# Patient Record
Sex: MALE | Race: WHITE | NOT HISPANIC OR LATINO | URBAN - METROPOLITAN AREA
[De-identification: names, ages, dates, MRNs, and addresses within clinical notes are randomized per-mention and may not be internally consistent; named-entity substitution may affect disease eponyms.]

---

## 2023-11-06 ENCOUNTER — HOSPITAL ENCOUNTER (EMERGENCY)
Facility: HOSPITAL | Age: 43
Discharge: HOME | End: 2023-11-06
Payer: COMMERCIAL

## 2023-11-06 ENCOUNTER — APPOINTMENT (OUTPATIENT)
Dept: RADIOLOGY | Facility: HOSPITAL | Age: 43
End: 2023-11-06
Payer: COMMERCIAL

## 2023-11-06 VITALS
OXYGEN SATURATION: 97 % | DIASTOLIC BLOOD PRESSURE: 80 MMHG | HEIGHT: 72 IN | WEIGHT: 201 LBS | RESPIRATION RATE: 16 BRPM | BODY MASS INDEX: 27.22 KG/M2 | TEMPERATURE: 98.2 F | SYSTOLIC BLOOD PRESSURE: 155 MMHG | HEART RATE: 77 BPM

## 2023-11-06 DIAGNOSIS — R10.9 FLANK PAIN: Primary | ICD-10-CM

## 2023-11-06 DIAGNOSIS — N20.0 RENAL CALCULI: ICD-10-CM

## 2023-11-06 PROBLEM — F11.10 NARCOTIC ABUSE (MULTI): Status: ACTIVE | Noted: 2023-11-06

## 2023-11-06 PROBLEM — I73.00 RAYNAUD'S DISEASE WITHOUT GANGRENE: Status: ACTIVE | Noted: 2021-03-24

## 2023-11-06 PROBLEM — F17.200 TOBACCO USE DISORDER: Status: ACTIVE | Noted: 2021-03-24

## 2023-11-06 PROBLEM — M79.643 HAND PAIN: Status: ACTIVE | Noted: 2023-11-06

## 2023-11-06 PROBLEM — R52 TINGLING PAIN: Status: ACTIVE | Noted: 2023-11-06

## 2023-11-06 PROBLEM — G43.109 MIGRAINE WITH AURA: Status: ACTIVE | Noted: 2023-11-06

## 2023-11-06 PROBLEM — Z71.85 VACCINE COUNSELING: Status: ACTIVE | Noted: 2023-11-06

## 2023-11-06 PROBLEM — E78.5 HYPERLIPIDEMIA: Status: ACTIVE | Noted: 2023-11-06

## 2023-11-06 PROBLEM — I10 ESSENTIAL HYPERTENSION: Status: ACTIVE | Noted: 2021-03-24

## 2023-11-06 LAB
ALBUMIN SERPL BCP-MCNC: 4.5 G/DL (ref 3.4–5)
ALP SERPL-CCNC: 57 U/L (ref 33–120)
ALT SERPL W P-5'-P-CCNC: 27 U/L (ref 10–52)
ANION GAP SERPL CALC-SCNC: 11 MMOL/L (ref 10–20)
APPEARANCE UR: CLEAR
AST SERPL W P-5'-P-CCNC: 17 U/L (ref 9–39)
BASOPHILS # BLD AUTO: 0.05 X10*3/UL (ref 0–0.1)
BASOPHILS NFR BLD AUTO: 0.5 %
BILIRUB SERPL-MCNC: 0.5 MG/DL (ref 0–1.2)
BILIRUB UR STRIP.AUTO-MCNC: NEGATIVE MG/DL
BUN SERPL-MCNC: 16 MG/DL (ref 6–23)
CALCIUM SERPL-MCNC: 9.1 MG/DL (ref 8.6–10.3)
CHLORIDE SERPL-SCNC: 104 MMOL/L (ref 98–107)
CO2 SERPL-SCNC: 26 MMOL/L (ref 21–32)
COLOR UR: NORMAL
CREAT SERPL-MCNC: 0.89 MG/DL (ref 0.5–1.3)
EOSINOPHIL # BLD AUTO: 0.17 X10*3/UL (ref 0–0.7)
EOSINOPHIL NFR BLD AUTO: 1.6 %
ERYTHROCYTE [DISTWIDTH] IN BLOOD BY AUTOMATED COUNT: 12.2 % (ref 11.5–14.5)
GFR SERPL CREATININE-BSD FRML MDRD: >90 ML/MIN/1.73M*2
GLUCOSE SERPL-MCNC: 95 MG/DL (ref 74–99)
GLUCOSE UR STRIP.AUTO-MCNC: NEGATIVE MG/DL
HCT VFR BLD AUTO: 45.1 % (ref 41–52)
HGB BLD-MCNC: 15.4 G/DL (ref 13.5–17.5)
IMM GRANULOCYTES # BLD AUTO: 0.05 X10*3/UL (ref 0–0.7)
IMM GRANULOCYTES NFR BLD AUTO: 0.5 % (ref 0–0.9)
KETONES UR STRIP.AUTO-MCNC: NEGATIVE MG/DL
LEUKOCYTE ESTERASE UR QL STRIP.AUTO: NEGATIVE
LYMPHOCYTES # BLD AUTO: 2.33 X10*3/UL (ref 1.2–4.8)
LYMPHOCYTES NFR BLD AUTO: 22.4 %
MCH RBC QN AUTO: 34.5 PG (ref 26–34)
MCHC RBC AUTO-ENTMCNC: 34.1 G/DL (ref 32–36)
MCV RBC AUTO: 101 FL (ref 80–100)
MONOCYTES # BLD AUTO: 0.77 X10*3/UL (ref 0.1–1)
MONOCYTES NFR BLD AUTO: 7.4 %
NEUTROPHILS # BLD AUTO: 7.01 X10*3/UL (ref 1.2–7.7)
NEUTROPHILS NFR BLD AUTO: 67.6 %
NITRITE UR QL STRIP.AUTO: NEGATIVE
NRBC BLD-RTO: 0 /100 WBCS (ref 0–0)
PH UR STRIP.AUTO: 6 [PH]
PLATELET # BLD AUTO: 235 X10*3/UL (ref 150–450)
POTASSIUM SERPL-SCNC: 4.4 MMOL/L (ref 3.5–5.3)
PROT SERPL-MCNC: 7.3 G/DL (ref 6.4–8.2)
PROT UR STRIP.AUTO-MCNC: NEGATIVE MG/DL
RBC # BLD AUTO: 4.46 X10*6/UL (ref 4.5–5.9)
RBC # UR STRIP.AUTO: NEGATIVE /UL
SODIUM SERPL-SCNC: 137 MMOL/L (ref 136–145)
SP GR UR STRIP.AUTO: 1.01
UROBILINOGEN UR STRIP.AUTO-MCNC: <2 MG/DL
WBC # BLD AUTO: 10.4 X10*3/UL (ref 4.4–11.3)

## 2023-11-06 PROCEDURE — 96361 HYDRATE IV INFUSION ADD-ON: CPT

## 2023-11-06 PROCEDURE — 74176 CT ABD & PELVIS W/O CONTRAST: CPT

## 2023-11-06 PROCEDURE — 81003 URINALYSIS AUTO W/O SCOPE: CPT | Performed by: NURSE PRACTITIONER

## 2023-11-06 PROCEDURE — 36415 COLL VENOUS BLD VENIPUNCTURE: CPT | Performed by: NURSE PRACTITIONER

## 2023-11-06 PROCEDURE — 96374 THER/PROPH/DIAG INJ IV PUSH: CPT

## 2023-11-06 PROCEDURE — 2500000004 HC RX 250 GENERAL PHARMACY W/ HCPCS (ALT 636 FOR OP/ED): Performed by: NURSE PRACTITIONER

## 2023-11-06 PROCEDURE — 80053 COMPREHEN METABOLIC PANEL: CPT | Performed by: NURSE PRACTITIONER

## 2023-11-06 PROCEDURE — 99285 EMERGENCY DEPT VISIT HI MDM: CPT | Mod: 25

## 2023-11-06 PROCEDURE — 99284 EMERGENCY DEPT VISIT MOD MDM: CPT | Mod: 25

## 2023-11-06 PROCEDURE — 96375 TX/PRO/DX INJ NEW DRUG ADDON: CPT

## 2023-11-06 PROCEDURE — 74176 CT ABD & PELVIS W/O CONTRAST: CPT | Performed by: RADIOLOGY

## 2023-11-06 PROCEDURE — 85025 COMPLETE CBC W/AUTO DIFF WBC: CPT | Performed by: NURSE PRACTITIONER

## 2023-11-06 RX ORDER — KETOROLAC TROMETHAMINE 30 MG/ML
15 INJECTION, SOLUTION INTRAMUSCULAR; INTRAVENOUS ONCE
Status: COMPLETED | OUTPATIENT
Start: 2023-11-06 | End: 2023-11-06

## 2023-11-06 RX ORDER — ONDANSETRON HYDROCHLORIDE 2 MG/ML
4 INJECTION, SOLUTION INTRAVENOUS ONCE
Status: COMPLETED | OUTPATIENT
Start: 2023-11-06 | End: 2023-11-06

## 2023-11-06 RX ORDER — ONDANSETRON 4 MG/1
4 TABLET, ORALLY DISINTEGRATING ORAL EVERY 8 HOURS PRN
Qty: 20 TABLET | Refills: 0 | Status: SHIPPED | OUTPATIENT
Start: 2023-11-06 | End: 2023-11-13

## 2023-11-06 RX ADMIN — SODIUM CHLORIDE 1000 ML: 9 INJECTION, SOLUTION INTRAVENOUS at 11:27

## 2023-11-06 RX ADMIN — ONDANSETRON 4 MG: 2 INJECTION INTRAMUSCULAR; INTRAVENOUS at 11:27

## 2023-11-06 RX ADMIN — KETOROLAC TROMETHAMINE 15 MG: 30 INJECTION, SOLUTION INTRAMUSCULAR; INTRAVENOUS at 11:27

## 2023-11-06 ASSESSMENT — COLUMBIA-SUICIDE SEVERITY RATING SCALE - C-SSRS
1. IN THE PAST MONTH, HAVE YOU WISHED YOU WERE DEAD OR WISHED YOU COULD GO TO SLEEP AND NOT WAKE UP?: NO
2. HAVE YOU ACTUALLY HAD ANY THOUGHTS OF KILLING YOURSELF?: NO
6. HAVE YOU EVER DONE ANYTHING, STARTED TO DO ANYTHING, OR PREPARED TO DO ANYTHING TO END YOUR LIFE?: NO

## 2023-11-06 ASSESSMENT — PAIN SCALES - GENERAL: PAINLEVEL_OUTOF10: 8

## 2023-11-06 ASSESSMENT — PAIN DESCRIPTION - ORIENTATION: ORIENTATION: RIGHT

## 2023-11-06 ASSESSMENT — PAIN - FUNCTIONAL ASSESSMENT: PAIN_FUNCTIONAL_ASSESSMENT: 0-10

## 2023-11-06 ASSESSMENT — PAIN DESCRIPTION - LOCATION: LOCATION: BACK

## 2023-11-06 NOTE — DISCHARGE INSTRUCTIONS
"What are kidney stones?  Kidney stones are just what they sound like: small stones that form inside the kidneys. They form when salts and minerals that are normally in urine build up and harden.    Kidney stones usually get carried out of the body when you urinate. But sometimes, they can get stuck on the way out (figure 1). If that happens, the stones can cause:    ?Pain in your side or in the lower part of your belly    ?Blood in your urine (which can make urine pink or red)    ?Nausea or vomiting    ?Pain when you urinate    ?Needing to urinate in a hurry    How do I know if I have kidney stones?  If your doctor or nurse thinks that you have kidney stones, they can order an imaging test that can show the stones. (Imaging tests create pictures of the inside of the body.)    How are kidney stones treated?  Each person's treatment is a little different. The right treatment for you will depend on:    ?The size, type, and location of your stone    ?How much pain you have    ?How much you are vomiting    If your stone is small and causes only mild symptoms, you might be able to stay home and wait for it to pass in your urine. If you are going to try this, your doctor will tell you what to do. This usually includes:    ?Drinking lots of fluids    ?Taking pain medicines or medicines that make it easier to pass the stone    ?Urinating through a strainer so you can catch the stone when it comes out    If your stone is big or causes severe symptoms, you might need treatment in the hospital. Kidney stones that do not pass on their own can be treated with:    ?\"Shock wave lithotripsy\" - A machine uses sound waves to break up stones into smaller pieces. This procedure does not involve surgery, but it can be painful.    ?\"Percutaneous nephrolithotomy\" - This is a special kind of surgery in which a doctor makes very small holes in your skin. The doctor passes tiny tools through the holes and into your kidney. Then, they remove " "the stone.    ?\"Ureteroscopy\" - A doctor puts a thin tube into your body the same way urine comes out. They use tools at the end of the tube to break up or remove stones.    What problems should I watch for?  If you are trying to pass a kidney stone at home, call your doctor or nurse for advice if:    ?You do not urinate for more than 8 hours.    ?You have a fever of 100.4°F (38°C) or higher, or chills.    ?Your urine is cloudy, smells bad, or has more blood in it than before.    ?The pain from your kidney stone gets very bad, and taking pain medicine doesn't help.    ?You are vomiting and can't keep liquids down.    ?Your pain does not go away after 1 to 2 weeks    What can I do to prevent getting kidney stones again?  Drink plenty of water. You might also need to change what you eat, depending on what your kidney stones were made of. If so, your doctor or nurse can tell you which foods to avoid. Your doctor or nurse might also prescribe you new medicines to keep you from having another kidney stone.  "

## 2023-11-06 NOTE — ED TRIAGE NOTES
Pt endorses pain to R flank since Saturday. Pt with hx of kidney stones, states similar pain as today. Pt denies any urological symptoms, denies blood in urine. Pt endorses nausea associated with episodes of flank pain. Pt ambulatory in triage, non-toxic appearing.

## 2023-11-06 NOTE — ED PROVIDER NOTES
HPI   Chief Complaint   Patient presents with   • Flank Pain       Patient is a healthy nontoxic-appearing 43-year-old male with past medical history of acute gastritis, depression, hypertension, esophageal reflux, gouty arthritis of the toe, hyperlipidemia, migraine, Raynaud's disease without gangrene, presents to the emergency room today for complaint of right flank pain.  Patient states pain began 2 days ago and feels similar to previous episodes when he had a kidney stone.  Patient complains of nausea without any vomiting, diarrhea or constipation.  Patient denies any injuries trauma or falls.  Patient denies any urinary complaints or blood in the urine.  Patient denies any testicular pain.  Patient denies any abdominal pain, chest pain, shortness of breath difficulty breathing, fever, shaking, or chills.                          No data recorded                Patient History   No past medical history on file.  No past surgical history on file.  No family history on file.  Social History     Tobacco Use   • Smoking status: Not on file   • Smokeless tobacco: Not on file   Substance Use Topics   • Alcohol use: Not on file   • Drug use: Not on file       Physical Exam   ED Triage Vitals [11/06/23 0956]   Temp Heart Rate Resp BP   36.8 °C (98.2 °F) 106 18 (!) 175/92      SpO2 Temp src Heart Rate Source Patient Position   100 % -- -- --      BP Location FiO2 (%)     -- --       Physical Exam  Vitals and nursing note reviewed. Exam conducted with a chaperone present.   Constitutional:       General: He is not in acute distress.     Appearance: Normal appearance. He is not ill-appearing, toxic-appearing or diaphoretic.   HENT:      Head: Normocephalic.      Nose: Nose normal.      Mouth/Throat:      Mouth: Mucous membranes are moist.   Eyes:      Extraocular Movements: Extraocular movements intact.      Pupils: Pupils are equal, round, and reactive to light.   Cardiovascular:      Rate and Rhythm: Normal rate and  regular rhythm.      Pulses: Normal pulses.      Heart sounds: Normal heart sounds. No murmur heard.     No friction rub. No gallop.   Pulmonary:      Effort: Pulmonary effort is normal. No respiratory distress.      Breath sounds: Normal breath sounds. No stridor. No wheezing, rhonchi or rales.   Chest:      Chest wall: No tenderness.   Abdominal:      General: There is no distension.      Palpations: There is no mass.      Tenderness: There is no abdominal tenderness. There is right CVA tenderness. There is no left CVA tenderness, guarding or rebound.      Hernia: No hernia is present.   Musculoskeletal:         General: Normal range of motion.      Cervical back: Normal range of motion and neck supple.   Skin:     General: Skin is warm and dry.      Capillary Refill: Capillary refill takes less than 2 seconds.      Coloration: Skin is not jaundiced or pale.      Findings: No bruising, erythema, lesion or rash.   Neurological:      Mental Status: He is alert.         ED Course & MDM   Diagnoses as of 11/06/23 1219   Flank pain   Renal calculi       Medical Decision Making  Given patient's complaint presentation a thorough exam was performed.  Patient does have right-sided CVA tenderness upon palpation with no reproducible abdominal tenderness upon palpation, no other distress lung sounds auscultated, speaking complete sentences no respiratory distress, denies any loss of bowel or bladder control, independently ambulatory down the difficulty, remains hemodynamically stable during emergency evaluation, I have a low suspicion for epidural abscess, cauda equina syndrome, acute abdomen.  Lab work was ordered as well as CT of the abdomen and pelvis. Lab work reveals several irregularities however no critical lab values, urinalysis was unremarkable, CT of the abdomen and pelvis reveals punctuate right kidney stone.  No hydronephrosis, small umbilical hernia, reevaluation of patient reveals moderate improvement in  symptoms have improved slightly but have been colicky.  Given findings I believe renal calculi is the source of patient's discomfort.  Patient offered Toradol for discomfort however declined and states like to use ibuprofen instead.  Patient did receive prescription for Zofran and encouraged following up with urology as needed however symptoms become worse return the emergency room for further evaluation.  Patient was agreeable this plan discharged home in stable condition.        Procedure  Procedures     KARLA Stallings-OMAIRA  11/06/23 1054       KARLA Stallings-OMAIRA  11/06/23 1216

## 2024-01-17 PROBLEM — I25.10 CAD (CORONARY ARTERY DISEASE): Chronic | Status: ACTIVE | Noted: 2024-01-17

## 2024-01-17 PROBLEM — I25.10 CAD (CORONARY ARTERY DISEASE): Status: ACTIVE | Noted: 2024-01-17

## 2024-01-24 ENCOUNTER — OFFICE VISIT (OUTPATIENT)
Dept: CARDIOLOGY | Facility: CLINIC | Age: 44
End: 2024-01-24
Payer: COMMERCIAL

## 2024-01-24 VITALS
SYSTOLIC BLOOD PRESSURE: 142 MMHG | DIASTOLIC BLOOD PRESSURE: 84 MMHG | BODY MASS INDEX: 28.35 KG/M2 | WEIGHT: 209 LBS | HEART RATE: 83 BPM | OXYGEN SATURATION: 96 %

## 2024-01-24 DIAGNOSIS — E78.2 MIXED HYPERLIPIDEMIA: Primary | Chronic | ICD-10-CM

## 2024-01-24 DIAGNOSIS — I25.10 CORONARY ARTERY DISEASE INVOLVING NATIVE CORONARY ARTERY OF NATIVE HEART WITHOUT ANGINA PECTORIS: Chronic | ICD-10-CM

## 2024-01-24 DIAGNOSIS — I10 ESSENTIAL HYPERTENSION: Chronic | ICD-10-CM

## 2024-01-24 PROBLEM — E78.5 HYPERLIPIDEMIA: Chronic | Status: ACTIVE | Noted: 2023-11-06

## 2024-01-24 PROCEDURE — 3079F DIAST BP 80-89 MM HG: CPT | Performed by: INTERNAL MEDICINE

## 2024-01-24 PROCEDURE — 99214 OFFICE O/P EST MOD 30 MIN: CPT | Performed by: INTERNAL MEDICINE

## 2024-01-24 PROCEDURE — 3077F SYST BP >= 140 MM HG: CPT | Performed by: INTERNAL MEDICINE

## 2024-01-24 RX ORDER — HYDROGEN PEROXIDE 3 %
20 SOLUTION, NON-ORAL MISCELLANEOUS AS NEEDED
COMMUNITY

## 2024-01-24 RX ORDER — ATORVASTATIN CALCIUM 40 MG/1
40 TABLET, FILM COATED ORAL DAILY
COMMUNITY
Start: 2023-03-23 | End: 2024-01-24

## 2024-01-24 RX ORDER — LOSARTAN POTASSIUM 100 MG/1
TABLET ORAL
COMMUNITY
Start: 2023-05-01

## 2024-01-24 RX ORDER — ERGOCALCIFEROL 1.25 MG/1
50000 CAPSULE ORAL
COMMUNITY
Start: 2023-05-01

## 2024-01-24 RX ORDER — AMLODIPINE BESYLATE 5 MG/1
5 TABLET ORAL DAILY
COMMUNITY
Start: 2023-03-23

## 2024-01-24 RX ORDER — METOPROLOL SUCCINATE 25 MG/1
25 TABLET, EXTENDED RELEASE ORAL DAILY
COMMUNITY
Start: 2024-01-17

## 2024-01-24 RX ORDER — ROSUVASTATIN CALCIUM 40 MG/1
40 TABLET, COATED ORAL DAILY
Qty: 90 TABLET | Refills: 3 | Status: SHIPPED | OUTPATIENT
Start: 2024-01-24 | End: 2024-02-06 | Stop reason: DRUGHIGH

## 2024-01-24 NOTE — PROGRESS NOTES
"Referred by No ref. provider found    HPI overall feeling fine.  Still getting atypical random episodes of chest pain predominantly at rest and usually not with activity.  This can last for a prolonged period of time up to 8 days.  Clinically not anginal.  Tolerating rosuvastatin without difficulty.    Past Medical History:  Problem List Items Addressed This Visit    None       Past Medical History:   Diagnosis Date    CAD (coronary artery disease) 01/17/2024 6/29/2023 = 336 (LM 70, , LCx 13, RCA 9)             Past Surgical History:  He has no past surgical history on file.      Social History:  He has no history on file for tobacco use, alcohol use, and drug use.    Family History:  No family history on file.     Allergies:  Atorvastatin and Lisinopril    Outpatient Medications:  No current outpatient medications     Last Recorded Vitals:  There were no vitals filed for this visit.    Physical Exam    Physical  Patient is alert and oriented x3.  HEENT is unremarkable mucous members are moist  Neck no JVP no bruits upstrokes are full no thyromegaly  Lungs are clear bilaterally.  No wheezing crackles or rales  Heart regular rhythm normal S1-S2 there is no S3 no murmurs are heard.  Abdomen is soft vessels are positive nontender nondistended no organomegaly no pulsatile masses  Extremities have no edema.  Distal pulses present palpable.  Neuro is grossly nonfocal  Skin has no rashes     Last Labs:  CBC -  Lab Results   Component Value Date    WBC 10.4 11/06/2023    HGB 15.4 11/06/2023    HCT 45.1 11/06/2023     (H) 11/06/2023     11/06/2023       CMP -  Lab Results   Component Value Date    CALCIUM 9.1 11/06/2023    PROT 7.3 11/06/2023    ALBUMIN 4.5 11/06/2023    AST 17 11/06/2023    ALT 27 11/06/2023    ALKPHOS 57 11/06/2023    BILITOT 0.5 11/06/2023       LIPID PANEL -   No results found for: \"CHOL\", \"HDL\", \"CHHDL\", \"LDL\", \"VLDL\", \"TRIG\", \"NHDL\"    RENAL FUNCTION PANEL -   Lab Results " "  Component Value Date    K 4.4 11/06/2023       No results found for: \"BNP\", \"HGBA1C\"       Procedure  CT / SCORING [06/29/2023] = 336 (LM 70 â€“ mod/lrg plaque component,  â€“ plaque scattered throughout w/lrg component w/in prox to mid portion, LCx 13 â€“ small plaque distally, RCA 9 â€“ small plaque proximally) â€¦ involvement greatest within LAD      EX NST [03/29/2023]: 9 min 20 sec (10.60 METs) . . . Normal â€“ 54%     ECHO [03/29/2023]: LVSF normal, est EF 60-65%     Assessment/Plan   1. CAD. Calcium score 336 units the majority located in the LAD territory. Continue with baby aspirin, metoprolol, losartan, and rosuvastatin. Stress testing revealed average functional capacity for age normal perfusion. Still getting random CP mostly at rest. It can last days at a time.  Clinically this is not anginal.     2. Hyperlipidemia.  Intolerant to atorvastatin due to cramping of his hands.  He is doing well with rosuvastatin 20 mg.  I will increase him up to 40 mg.  Liver enzymes were normal.  He will follow-up with Dr. Limon next month to have blood work.  Target LDL less than 70.      3. Hypertension well-controlled. Typically at home the SBP is130.     4. Elevated heart rate. With the addition of a low-dose of metoprolol succinate his heart rate is much better.  At times at nighttime he still feels extra heartbeats and palpitations.  I believe these are PACs and PVCs by his description.     RTC 1 year.    Leonides Andersen MD     Instructions and follow up    "

## 2024-02-06 ENCOUNTER — TELEPHONE (OUTPATIENT)
Dept: CARDIOLOGY | Facility: CLINIC | Age: 44
End: 2024-02-06
Payer: COMMERCIAL

## 2024-02-06 DIAGNOSIS — E78.2 MIXED HYPERLIPIDEMIA: Chronic | ICD-10-CM

## 2024-02-06 RX ORDER — ROSUVASTATIN CALCIUM 40 MG/1
40 TABLET, COATED ORAL DAILY
Qty: 90 TABLET | Refills: 3 | Status: SHIPPED | COMMUNITY
Start: 2024-02-06

## 2024-02-06 NOTE — TELEPHONE ENCOUNTER
Spoke with patient and instructions provided- decrease Rosuvastatin to 20mg x2 weeks, monitor for decreased or resolution of symptoms, attempt to re-initiate higher dose 40mg. Call if any concerns. Patient verb understanding.

## 2024-02-06 NOTE — TELEPHONE ENCOUNTER
"Spoke with patient. States symptoms started shortly after starting Rosuvastatin, symptoms come and go, HR not necessarily elevated more of a \"pounding\" feeling. Instructed headaches could be related to medication, unsure if HR or salty taste in mouth related but will review with Dr. Andersen.  Was able to tolerate lower dose of Rosuvastatin. Unable to tolerate atorvastatin.  "

## 2024-02-06 NOTE — TELEPHONE ENCOUNTER
Patient recently saw Ganesh and he adjusted his Rosuvastatin and doubled it. For the past couple of days he has been feeling his heart rate go high, has had a salt taste in his mouth and having headaches. Could this be related?

## 2024-06-26 ENCOUNTER — TELEPHONE (OUTPATIENT)
Dept: CARDIOLOGY | Facility: CLINIC | Age: 44
End: 2024-06-26

## 2024-06-26 NOTE — TELEPHONE ENCOUNTER
Kalyan saw a doctor for his back who did an MRI and xrays which showed a build up of calcium in is aortic valve. Pt has a copy of these on a disc that he can drop off to the office for Dr. Andersen to see. He is very concerned and would like to know Dr. Andersen's thoughts/plan.

## 2024-06-26 NOTE — TELEPHONE ENCOUNTER
"Spoke with patient to request report of the reading of MRI. Would be unable to read/open the disc. Patient stated it was not on the report- \"the doctor showed me the pictures\".  "

## 2024-07-10 PROBLEM — M79.643 HAND PAIN: Status: RESOLVED | Noted: 2023-11-06 | Resolved: 2024-07-10

## 2024-07-10 PROBLEM — Z71.85 VACCINE COUNSELING: Status: RESOLVED | Noted: 2023-11-06 | Resolved: 2024-07-10

## 2024-07-10 PROBLEM — R52 TINGLING PAIN: Status: RESOLVED | Noted: 2023-11-06 | Resolved: 2024-07-10

## 2024-07-11 ENCOUNTER — OFFICE VISIT (OUTPATIENT)
Dept: CARDIOLOGY | Facility: CLINIC | Age: 44
End: 2024-07-11
Payer: COMMERCIAL

## 2024-07-11 VITALS
BODY MASS INDEX: 28.35 KG/M2 | WEIGHT: 209 LBS | OXYGEN SATURATION: 96 % | HEART RATE: 95 BPM | DIASTOLIC BLOOD PRESSURE: 82 MMHG | SYSTOLIC BLOOD PRESSURE: 124 MMHG

## 2024-07-11 DIAGNOSIS — I10 ESSENTIAL HYPERTENSION: Chronic | ICD-10-CM

## 2024-07-11 DIAGNOSIS — E78.2 MIXED HYPERLIPIDEMIA: Chronic | ICD-10-CM

## 2024-07-11 DIAGNOSIS — I25.10 CORONARY ARTERY DISEASE INVOLVING NATIVE CORONARY ARTERY OF NATIVE HEART WITHOUT ANGINA PECTORIS: Primary | Chronic | ICD-10-CM

## 2024-07-11 DIAGNOSIS — F17.200 TOBACCO USE DISORDER: ICD-10-CM

## 2024-07-11 PROBLEM — R94.31 ABNORMAL ELECTROCARDIOGRAPHY: Status: ACTIVE | Noted: 2024-07-11

## 2024-07-11 PROBLEM — R06.09 DYSPNEA ON EXERTION: Status: ACTIVE | Noted: 2024-07-11

## 2024-07-11 PROBLEM — R07.9 CHEST PAIN: Status: ACTIVE | Noted: 2024-07-11

## 2024-07-11 PROCEDURE — 3074F SYST BP LT 130 MM HG: CPT | Performed by: INTERNAL MEDICINE

## 2024-07-11 PROCEDURE — 3079F DIAST BP 80-89 MM HG: CPT | Performed by: INTERNAL MEDICINE

## 2024-07-11 PROCEDURE — 99215 OFFICE O/P EST HI 40 MIN: CPT | Performed by: INTERNAL MEDICINE

## 2024-07-11 RX ORDER — HYDROCHLOROTHIAZIDE 12.5 MG/1
12.5 TABLET ORAL
COMMUNITY
Start: 2024-07-08

## 2024-07-11 RX ORDER — ASPIRIN 81 MG/1
81 TABLET ORAL DAILY
Qty: 30 TABLET | Refills: 11 | Status: SHIPPED | OUTPATIENT
Start: 2024-07-11 | End: 2025-07-11

## 2024-07-11 NOTE — PROGRESS NOTES
Referred by No ref. provider found    HPI   I am seeing Kalyan is an urgent add-on.  I spoke with Dr. Limon yesterday.  For the last couple of months Margaret been having more dyspnea with exertion with things that typically would not cause that.  He continues to have a atypical pain which happens intermittently and predominantly at rest which I believe is not anginal.  Because of the symptoms he is obviously concerned.  Hydrochlorothiazide was added to his regimen.  He has been tolerating the higher dose of rosuvastatin without difficulty.  He does have some mild lower extremity edema but no orthopnea.  No change in his pain.  Past Medical History:  Problem List Items Addressed This Visit    None     Past Medical History:   Diagnosis Date    CAD (coronary artery disease) 01/17/2024 6/29/2023 = 336 (LM 70, , LCx 13, RCA 9)    Essential hypertension 03/24/2021    Hyperlipidemia 11/06/2023        Past Surgical History:  He has no past surgical history on file.      Social History:  He reports that he has been smoking cigarettes. He has a 10 pack-year smoking history. He has never used smokeless tobacco. No history on file for alcohol use and drug use.    Family History:  No family history on file.     Allergies:  Atorvastatin and Lisinopril    Outpatient Medications:  Current Outpatient Medications   Medication Instructions    amLODIPine (NORVASC) 5 mg, oral, Daily    ergocalciferol (VITAMIN D-2) 50,000 Units, oral, Once Weekly    esomeprazole (NEXIUM) 20 mg, oral, As needed, Do not open capsule.    losartan (Cozaar) 100 mg tablet 1 tabs, ORAL, DAILY, 90 tabs, 30, Date: 9/11/23 7:48:00 AM EDT, [id number], TAKE ONE TABLET BY MOUTH EVERY DAY, 182.88, 05/13/2022 10:06:00 EDT, Height/Length Dosing, cm, 86.18, 05/13/2022 10:06:00 EDT, Weight Dosing, kg    metoprolol succinate XL (TOPROL-XL) 25 mg, oral, Daily    rosuvastatin (CRESTOR) 40 mg, oral, Daily     Last Recorded Vitals:  There were no  "vitals filed for this visit.    Physical Exam    Physical  Patient is alert and oriented x3.  HEENT is unremarkable mucous members are moist  Neck no JVP no bruits upstrokes are full no thyromegaly  Lungs are clear bilaterally.  No wheezing crackles or rales  Heart regular rhythm normal S1-S2 there is no S3 no murmurs are heard.  Abdomen is soft bs are positive nontender nondistended no organomegaly no pulsatile masses  Extremities have no edema.  Distal pulses present palpable.  Neuro is grossly nonfocal  Skin has no rashes     Last Labs:  CBC -  Lab Results   Component Value Date    WBC 10.4 11/06/2023    HGB 15.4 11/06/2023    HCT 45.1 11/06/2023     (H) 11/06/2023     11/06/2023     CMP -  Lab Results   Component Value Date    CALCIUM 9.1 11/06/2023    PROT 7.3 11/06/2023    ALBUMIN 4.5 11/06/2023    AST 17 11/06/2023    ALT 27 11/06/2023    ALKPHOS 57 11/06/2023    BILITOT 0.5 11/06/2023     LIPID PANEL -   No results found for: \"CHOL\", \"HDL\", \"CHHDL\", \"LDL\", \"VLDL\", \"TRIG\", \"NHDL\"    RENAL FUNCTION PANEL -   Lab Results   Component Value Date    K 4.4 11/06/2023     No results found for: \"BNP\", \"HGBA1C\"       Procedure  CT / SCORING [06/29/2023] = 336 (LM 70 â€“ mod/lrg plaque component,  â€“ plaque scattered throughout w/lrg component w/in prox to mid portion, LCx 13 â€“ small plaque distally, RCA 9 â€“ small plaque proximally) â€¦ involvement greatest within LAD      EX NST [03/29/2023]: 9 min 20 sec (10.60 METs) . . . Normal â€“ 54%     ECHO [03/29/2023]: LVSF normal, est EF 60-65%     Assessment/Plan   1. CAD. Calcium score 336 units the majority located in the LAD territory.  When I last seen him in January, he was having atypical chest pain only at rest which have been chronic.  Clinically not anginal.  Stress testing was without ischemia.  Now over the last couple months has been having dyspnea on exertion which is new for him.  I am concerned about the possibility of ischemia but " this also could be noncardiac including pulmonary as he continues to smoke.  I have suggested proceeding with cardiac catheterization.  Risk benefits and alternative therapies have been explained to him.  He is willing to proceed.      2. Hyperlipidemia.  He is now on 40 mg of rosuvastatin.  Followed by Dr. Limon.  Target LDL less than 70.    3. Hypertension well-controlled. Typically at home the SBP is130.  Better controlled now that he is on hydrochlorothiazide     4. Elevated heart rate.  This is improved with metoprolol.  If needed this dose can be increased.    5.  Tobacco abuse.  He is still smoking 5 cigarettes a day.  This may certainly be contributing to his shortness of breath.  Smoking cessation was strongly advised.    Cardiac catheterization will be arranged.  I will see him back approximately 4 weeks after the catheterization.  Leonides Andersen MD     Instructions and follow up

## 2024-07-11 NOTE — PATIENT INSTRUCTIONS
1. CAD. Calcium score 336 units the majority located in the LAD territory.  When I last seen him in January, he was having atypical chest pain only at rest which have been chronic.  Clinically not anginal.  Stress testing was without ischemia.  Now over the last couple months has been having dyspnea on exertion which is new for him.  I am concerned about the possibility of ischemia but this also could be noncardiac including pulmonary as he continues to smoke.  I have suggested proceeding with cardiac catheterization.  Risk benefits and alternative therapies have been explained to him.  He is willing to proceed.      2. Hyperlipidemia.  He is now on 40 mg of rosuvastatin.  Followed by Dr. Limon.  Target LDL less than 70.    3. Hypertension well-controlled. Typically at home the SBP is130.  Better controlled now that he is on hydrochlorothiazide     4. Elevated heart rate.  This is improved with metoprolol.  If needed this dose can be increased.    5.  Tobacco abuse.  He is still smoking 5 cigarettes a day.  This may certainly be contributing to his shortness of breath.  Smoking cessation was strongly advised.    Cardiac catheterization will be arranged.  I will see him back approximately 4 weeks after the catheterization.

## 2024-07-22 ENCOUNTER — LAB (OUTPATIENT)
Dept: LAB | Facility: LAB | Age: 44
End: 2024-07-22
Payer: COMMERCIAL

## 2024-07-22 DIAGNOSIS — R07.9 CHEST PAIN: ICD-10-CM

## 2024-07-22 LAB
ANION GAP SERPL CALC-SCNC: 14 MMOL/L (ref 10–20)
BASOPHILS # BLD AUTO: 0.06 X10*3/UL (ref 0–0.1)
BASOPHILS NFR BLD AUTO: 0.6 %
BUN SERPL-MCNC: 14 MG/DL (ref 6–23)
CALCIUM SERPL-MCNC: 9.3 MG/DL (ref 8.6–10.3)
CHLORIDE SERPL-SCNC: 103 MMOL/L (ref 98–107)
CO2 SERPL-SCNC: 25 MMOL/L (ref 21–32)
CREAT SERPL-MCNC: 0.93 MG/DL (ref 0.5–1.3)
EGFRCR SERPLBLD CKD-EPI 2021: >90 ML/MIN/1.73M*2
EOSINOPHIL # BLD AUTO: 0.38 X10*3/UL (ref 0–0.7)
EOSINOPHIL NFR BLD AUTO: 3.8 %
ERYTHROCYTE [DISTWIDTH] IN BLOOD BY AUTOMATED COUNT: 12.3 % (ref 11.5–14.5)
GLUCOSE SERPL-MCNC: 98 MG/DL (ref 74–99)
HCT VFR BLD AUTO: 42.5 % (ref 41–52)
HGB BLD-MCNC: 14.9 G/DL (ref 13.5–17.5)
IMM GRANULOCYTES # BLD AUTO: 0.03 X10*3/UL (ref 0–0.7)
IMM GRANULOCYTES NFR BLD AUTO: 0.3 % (ref 0–0.9)
INR PPP: 1.1 (ref 0.9–1.1)
LYMPHOCYTES # BLD AUTO: 3.04 X10*3/UL (ref 1.2–4.8)
LYMPHOCYTES NFR BLD AUTO: 30.5 %
MCH RBC QN AUTO: 34.6 PG (ref 26–34)
MCHC RBC AUTO-ENTMCNC: 35.1 G/DL (ref 32–36)
MCV RBC AUTO: 99 FL (ref 80–100)
MONOCYTES # BLD AUTO: 0.86 X10*3/UL (ref 0.1–1)
MONOCYTES NFR BLD AUTO: 8.6 %
NEUTROPHILS # BLD AUTO: 5.61 X10*3/UL (ref 1.2–7.7)
NEUTROPHILS NFR BLD AUTO: 56.2 %
NRBC BLD-RTO: 0 /100 WBCS (ref 0–0)
PLATELET # BLD AUTO: 253 X10*3/UL (ref 150–450)
POTASSIUM SERPL-SCNC: 3.9 MMOL/L (ref 3.5–5.3)
PROTHROMBIN TIME: 12.1 SECONDS (ref 9.8–12.8)
RBC # BLD AUTO: 4.31 X10*6/UL (ref 4.5–5.9)
SODIUM SERPL-SCNC: 138 MMOL/L (ref 136–145)
WBC # BLD AUTO: 10 X10*3/UL (ref 4.4–11.3)

## 2024-07-22 PROCEDURE — 80048 BASIC METABOLIC PNL TOTAL CA: CPT

## 2024-07-22 PROCEDURE — 36415 COLL VENOUS BLD VENIPUNCTURE: CPT

## 2024-07-22 PROCEDURE — 85610 PROTHROMBIN TIME: CPT

## 2024-07-22 PROCEDURE — 85025 COMPLETE CBC W/AUTO DIFF WBC: CPT

## 2024-07-26 ENCOUNTER — HOSPITAL ENCOUNTER (OUTPATIENT)
Dept: CARDIOLOGY | Facility: HOSPITAL | Age: 44
Discharge: HOME | End: 2024-07-26
Payer: COMMERCIAL

## 2024-07-26 ENCOUNTER — HOSPITAL ENCOUNTER (OUTPATIENT)
Facility: HOSPITAL | Age: 44
Setting detail: OUTPATIENT SURGERY
Discharge: HOME | End: 2024-07-26
Attending: STUDENT IN AN ORGANIZED HEALTH CARE EDUCATION/TRAINING PROGRAM | Admitting: STUDENT IN AN ORGANIZED HEALTH CARE EDUCATION/TRAINING PROGRAM
Payer: COMMERCIAL

## 2024-07-26 ENCOUNTER — PHARMACY VISIT (OUTPATIENT)
Dept: PHARMACY | Facility: CLINIC | Age: 44
End: 2024-07-26
Payer: COMMERCIAL

## 2024-07-26 VITALS
BODY MASS INDEX: 28.46 KG/M2 | WEIGHT: 210.1 LBS | HEIGHT: 72 IN | OXYGEN SATURATION: 99 % | HEART RATE: 68 BPM | TEMPERATURE: 97.2 F | RESPIRATION RATE: 18 BRPM | SYSTOLIC BLOOD PRESSURE: 150 MMHG | DIASTOLIC BLOOD PRESSURE: 105 MMHG

## 2024-07-26 DIAGNOSIS — I20.0 UNSTABLE ANGINA (MULTI): ICD-10-CM

## 2024-07-26 DIAGNOSIS — E78.2 MIXED HYPERLIPIDEMIA: Chronic | ICD-10-CM

## 2024-07-26 DIAGNOSIS — I25.10 CORONARY ARTERY DISEASE INVOLVING NATIVE CORONARY ARTERY OF NATIVE HEART WITHOUT ANGINA PECTORIS: Chronic | ICD-10-CM

## 2024-07-26 DIAGNOSIS — Z95.820 S/P PERCUTANEOUS TRANSLUMINAL ANGIOPLASTY (PTA) WITH STENT PLACEMENT: ICD-10-CM

## 2024-07-26 DIAGNOSIS — R07.9 CHEST PAIN: Primary | ICD-10-CM

## 2024-07-26 PROCEDURE — 2500000005 HC RX 250 GENERAL PHARMACY W/O HCPCS: Performed by: STUDENT IN AN ORGANIZED HEALTH CARE EDUCATION/TRAINING PROGRAM

## 2024-07-26 PROCEDURE — 2500000004 HC RX 250 GENERAL PHARMACY W/ HCPCS (ALT 636 FOR OP/ED): Performed by: NURSE PRACTITIONER

## 2024-07-26 PROCEDURE — 99153 MOD SED SAME PHYS/QHP EA: CPT | Performed by: STUDENT IN AN ORGANIZED HEALTH CARE EDUCATION/TRAINING PROGRAM

## 2024-07-26 PROCEDURE — RXMED WILLOW AMBULATORY MEDICATION CHARGE

## 2024-07-26 PROCEDURE — 2780000003 HC OR 278 NO HCPCS: Performed by: STUDENT IN AN ORGANIZED HEALTH CARE EDUCATION/TRAINING PROGRAM

## 2024-07-26 PROCEDURE — 93010 ELECTROCARDIOGRAM REPORT: CPT | Performed by: STUDENT IN AN ORGANIZED HEALTH CARE EDUCATION/TRAINING PROGRAM

## 2024-07-26 PROCEDURE — 96373 THER/PROPH/DIAG INJ IA: CPT | Performed by: STUDENT IN AN ORGANIZED HEALTH CARE EDUCATION/TRAINING PROGRAM

## 2024-07-26 PROCEDURE — 2500000004 HC RX 250 GENERAL PHARMACY W/ HCPCS (ALT 636 FOR OP/ED): Mod: JZ | Performed by: STUDENT IN AN ORGANIZED HEALTH CARE EDUCATION/TRAINING PROGRAM

## 2024-07-26 PROCEDURE — C9600 PERC DRUG-EL COR STENT SING: HCPCS | Mod: LC | Performed by: STUDENT IN AN ORGANIZED HEALTH CARE EDUCATION/TRAINING PROGRAM

## 2024-07-26 PROCEDURE — C1769 GUIDE WIRE: HCPCS | Performed by: STUDENT IN AN ORGANIZED HEALTH CARE EDUCATION/TRAINING PROGRAM

## 2024-07-26 PROCEDURE — C1760 CLOSURE DEV, VASC: HCPCS | Performed by: STUDENT IN AN ORGANIZED HEALTH CARE EDUCATION/TRAINING PROGRAM

## 2024-07-26 PROCEDURE — 99152 MOD SED SAME PHYS/QHP 5/>YRS: CPT | Performed by: STUDENT IN AN ORGANIZED HEALTH CARE EDUCATION/TRAINING PROGRAM

## 2024-07-26 PROCEDURE — 93458 L HRT ARTERY/VENTRICLE ANGIO: CPT | Performed by: STUDENT IN AN ORGANIZED HEALTH CARE EDUCATION/TRAINING PROGRAM

## 2024-07-26 PROCEDURE — C1887 CATHETER, GUIDING: HCPCS | Performed by: STUDENT IN AN ORGANIZED HEALTH CARE EDUCATION/TRAINING PROGRAM

## 2024-07-26 PROCEDURE — 2500000002 HC RX 250 W HCPCS SELF ADMINISTERED DRUGS (ALT 637 FOR MEDICARE OP, ALT 636 FOR OP/ED): Performed by: STUDENT IN AN ORGANIZED HEALTH CARE EDUCATION/TRAINING PROGRAM

## 2024-07-26 PROCEDURE — 7100000009 HC PHASE TWO TIME - INITIAL BASE CHARGE: Performed by: STUDENT IN AN ORGANIZED HEALTH CARE EDUCATION/TRAINING PROGRAM

## 2024-07-26 PROCEDURE — 2550000001 HC RX 255 CONTRASTS: Performed by: STUDENT IN AN ORGANIZED HEALTH CARE EDUCATION/TRAINING PROGRAM

## 2024-07-26 PROCEDURE — 2720000007 HC OR 272 NO HCPCS: Performed by: STUDENT IN AN ORGANIZED HEALTH CARE EDUCATION/TRAINING PROGRAM

## 2024-07-26 PROCEDURE — 7100000010 HC PHASE TWO TIME - EACH INCREMENTAL 1 MINUTE: Performed by: STUDENT IN AN ORGANIZED HEALTH CARE EDUCATION/TRAINING PROGRAM

## 2024-07-26 PROCEDURE — 93005 ELECTROCARDIOGRAM TRACING: CPT | Mod: 59

## 2024-07-26 PROCEDURE — C1725 CATH, TRANSLUMIN NON-LASER: HCPCS | Performed by: STUDENT IN AN ORGANIZED HEALTH CARE EDUCATION/TRAINING PROGRAM

## 2024-07-26 PROCEDURE — 93458 L HRT ARTERY/VENTRICLE ANGIO: CPT | Mod: 59 | Performed by: STUDENT IN AN ORGANIZED HEALTH CARE EDUCATION/TRAINING PROGRAM

## 2024-07-26 PROCEDURE — 93005 ELECTROCARDIOGRAM TRACING: CPT

## 2024-07-26 PROCEDURE — C1874 STENT, COATED/COV W/DEL SYS: HCPCS | Performed by: STUDENT IN AN ORGANIZED HEALTH CARE EDUCATION/TRAINING PROGRAM

## 2024-07-26 PROCEDURE — C1894 INTRO/SHEATH, NON-LASER: HCPCS | Performed by: STUDENT IN AN ORGANIZED HEALTH CARE EDUCATION/TRAINING PROGRAM

## 2024-07-26 PROCEDURE — 92928 PRQ TCAT PLMT NTRAC ST 1 LES: CPT | Performed by: STUDENT IN AN ORGANIZED HEALTH CARE EDUCATION/TRAINING PROGRAM

## 2024-07-26 DEVICE — STENT ONYXNG27515UX ONYX 2.75X15RX
Type: IMPLANTABLE DEVICE | Site: CORONARY | Status: FUNCTIONAL
Brand: ONYX FRONTIER™

## 2024-07-26 RX ORDER — ACETAMINOPHEN 325 MG/1
650 TABLET ORAL EVERY 6 HOURS PRN
Status: DISCONTINUED | OUTPATIENT
Start: 2024-07-26 | End: 2024-07-26 | Stop reason: HOSPADM

## 2024-07-26 RX ORDER — NITROGLYCERIN 40 MG/100ML
INJECTION INTRAVENOUS AS NEEDED
Status: DISCONTINUED | OUTPATIENT
Start: 2024-07-26 | End: 2024-07-26 | Stop reason: HOSPADM

## 2024-07-26 RX ORDER — LIDOCAINE HYDROCHLORIDE 20 MG/ML
INJECTION, SOLUTION INFILTRATION; PERINEURAL AS NEEDED
Status: DISCONTINUED | OUTPATIENT
Start: 2024-07-26 | End: 2024-07-26 | Stop reason: HOSPADM

## 2024-07-26 RX ORDER — HEPARIN SODIUM 1000 [USP'U]/ML
INJECTION, SOLUTION INTRAVENOUS; SUBCUTANEOUS AS NEEDED
Status: DISCONTINUED | OUTPATIENT
Start: 2024-07-26 | End: 2024-07-26 | Stop reason: HOSPADM

## 2024-07-26 RX ORDER — ASPIRIN 81 MG/1
81 TABLET ORAL DAILY
Qty: 90 TABLET | Refills: 3 | Status: SHIPPED | OUTPATIENT
Start: 2024-07-26 | End: 2025-07-26

## 2024-07-26 RX ORDER — ASPIRIN 81 MG/1
81 TABLET ORAL DAILY
Qty: 90 TABLET | Refills: 3 | Status: SHIPPED | OUTPATIENT
Start: 2024-07-26 | End: 2024-07-26

## 2024-07-26 RX ORDER — VERAPAMIL HYDROCHLORIDE 2.5 MG/ML
INJECTION, SOLUTION INTRAVENOUS AS NEEDED
Status: DISCONTINUED | OUTPATIENT
Start: 2024-07-26 | End: 2024-07-26 | Stop reason: HOSPADM

## 2024-07-26 RX ORDER — ROSUVASTATIN CALCIUM 40 MG/1
40 TABLET, COATED ORAL DAILY
Qty: 90 TABLET | Refills: 3 | Status: SHIPPED | OUTPATIENT
Start: 2024-07-26 | End: 2025-07-26

## 2024-07-26 RX ORDER — NAPROXEN SODIUM 220 MG/1
81 TABLET, FILM COATED ORAL ONCE
Status: DISCONTINUED | OUTPATIENT
Start: 2024-07-26 | End: 2024-07-26 | Stop reason: HOSPADM

## 2024-07-26 RX ORDER — ROSUVASTATIN CALCIUM 40 MG/1
40 TABLET, COATED ORAL DAILY
Qty: 90 TABLET | Refills: 3 | Status: SHIPPED | OUTPATIENT
Start: 2024-07-26 | End: 2024-07-26

## 2024-07-26 RX ORDER — FENTANYL CITRATE 50 UG/ML
INJECTION, SOLUTION INTRAMUSCULAR; INTRAVENOUS AS NEEDED
Status: DISCONTINUED | OUTPATIENT
Start: 2024-07-26 | End: 2024-07-26 | Stop reason: HOSPADM

## 2024-07-26 RX ORDER — MIDAZOLAM HYDROCHLORIDE 1 MG/ML
INJECTION, SOLUTION INTRAMUSCULAR; INTRAVENOUS AS NEEDED
Status: DISCONTINUED | OUTPATIENT
Start: 2024-07-26 | End: 2024-07-26 | Stop reason: HOSPADM

## 2024-07-26 RX ORDER — SODIUM CHLORIDE 9 MG/ML
100 INJECTION, SOLUTION INTRAVENOUS CONTINUOUS
Status: DISCONTINUED | OUTPATIENT
Start: 2024-07-26 | End: 2024-07-26 | Stop reason: HOSPADM

## 2024-07-26 RX ORDER — SODIUM CHLORIDE 9 MG/ML
1.5 INJECTION, SOLUTION INTRAVENOUS CONTINUOUS
Status: DISCONTINUED | OUTPATIENT
Start: 2024-07-26 | End: 2024-07-26 | Stop reason: HOSPADM

## 2024-07-26 ASSESSMENT — PAIN SCALES - GENERAL

## 2024-07-26 ASSESSMENT — PAIN - FUNCTIONAL ASSESSMENT
PAIN_FUNCTIONAL_ASSESSMENT: 0-10

## 2024-07-26 ASSESSMENT — COLUMBIA-SUICIDE SEVERITY RATING SCALE - C-SSRS
2. HAVE YOU ACTUALLY HAD ANY THOUGHTS OF KILLING YOURSELF?: NO
6. HAVE YOU EVER DONE ANYTHING, STARTED TO DO ANYTHING, OR PREPARED TO DO ANYTHING TO END YOUR LIFE?: NO
1. IN THE PAST MONTH, HAVE YOU WISHED YOU WERE DEAD OR WISHED YOU COULD GO TO SLEEP AND NOT WAKE UP?: NO

## 2024-07-26 NOTE — H&P
History and Physical   Pre Surgical Review (< 30 days)      History & Physical Reviewed    I have reviewed the History and Physical dated:  7/11/24   History and Physical reviewed and relevant findings noted. Patient examined to review pertinent physical  findings.: No significant changes   Home Medications Reviewed: see medication list below   Allergies Reviewed: no changes noted      Home Medications  Current Outpatient Medications   Medication Instructions    amLODIPine (NORVASC) 5 mg, oral, Daily    aspirin 81 mg, oral, Daily    ergocalciferol (VITAMIN D-2) 50,000 Units, oral, Once Weekly    esomeprazole (NEXIUM) 20 mg, oral, As needed, Do not open capsule.    hydroCHLOROthiazide (MICROZIDE) 12.5 mg    losartan (Cozaar) 100 mg tablet 1 tabs, ORAL, DAILY, 90 tabs, 30, Date: 9/11/23 7:48:00 AM EDT, [id number], TAKE ONE TABLET BY MOUTH EVERY DAY, 182.88, 05/13/2022 10:06:00 EDT, Height/Length Dosing, cm, 86.18, 05/13/2022 10:06:00 EDT, Weight Dosing, kg    metoprolol succinate XL (TOPROL-XL) 25 mg, oral, Daily    rosuvastatin (CRESTOR) 40 mg, oral, Daily        Allergies  Allergies   Allergen Reactions    Atorvastatin Myalgia     Finger joint pain    Lisinopril Cough       Physical Exam  Physical Exam  Constitutional:       General: He is not in acute distress.  Cardiovascular:      Rate and Rhythm: Normal rate and regular rhythm.      Comments: + pulses all extremities.  Pulmonary:      Effort: Pulmonary effort is normal. No respiratory distress.      Comments: Clear bilaterally.  Abdominal:      General: Bowel sounds are normal.   Skin:     General: Skin is warm and dry.   Neurological:      General: No focal deficit present.      Mental Status: He is alert and oriented to person, place, and time.   Psychiatric:         Mood and Affect: Mood normal.         Behavior: Behavior normal.          Airway/Sedation Assessment     Assessment by anesthesia N/A     ·  Mouth Opening OK yes       Neck Flexibility OK yes      Loose Teeth No   ·  Oropharyngeal Classification Grade III   ·  ASA PS Classification ASA II - Patient with mild systemic disease      Sedation Plan Moderate     Risks, benefits, and alternatives discussed with patient.     ERAS (Enhanced Recovery After Surgery):  ·  ERAS Patient: No      Consent:   COVID-19 Consent:  ·  COVID-19 Risk Consent Surgeon has reviewed key risks related to the risk of jeff COVID-19 and if they contract COVID-19 what the risks are.     Mireya Patrick, APRN-CNP

## 2024-07-26 NOTE — Clinical Note
Angioplasty of the circumflex lesion. Inflation 1: Pressure = 16 carlos; Duration = 12 sec. Inflation 2: Pressure = 14 carlos; Duration = 5 sec. Inflation 3: Pressure = 16 carlos; Duration = 6 sec. Inflation 4: Pressure = 14 carlos; Duration = 6 sec. LPLV

## 2024-07-26 NOTE — NURSING NOTE
Discharge instructions given to patient and significant other, verbalized understanding.  Right wrist stable.  No chest discomfort noted.  Reinforced importance of medications at home.     1415  Discharged via wheelchair to home with folder with stent card inside.

## 2024-07-26 NOTE — Clinical Note
Report was give by Germania Bacon in holding room. Stent card placed in pts folder given to holding room RN.

## 2024-07-26 NOTE — DISCHARGE INSTRUCTIONS
CARDIAC CATHETERIZATION DISCHARGE INSTRUCTIONS     FOR SUDDEN AND SEVERE CHEST PAIN, SHORTNESS OF BREATH, EXCESSIVE BLEEDING, SIGNS OF STROKE, OR CHANGES IN MENTAL STATUS YOU SHOULD CALL 911 IMMEDIATELY.     If your provider has prescribed aspirin and/or clopidogrel (Plavix), or prasugrel (Effient), or ticagrelor (Brilinta), DO NOT STOP THESE MEDICATIONS for any reason without talking to your cardiologist first. If any of these were prescribed, you must take them every day without missing a single dose. If you are getting low on these medications, contact your provider immediately for a refill.     FOR NEXT 24 HOURS  - Upon discharge, you should return home and rest for the remainder of the day and evening. You do not have to stay on bed rest but should not be very active.  It is recommended a responsible adult be with you for the first 24 hours after the procedure.    - No driving for 24 hours after procedure. Please arrange for someone to drive you home from the hospital today.     - Do not drive, operate machinery, or use power tools for 24 hours after your procedure.     - Do not make any legal decisions for 24 hours after your procedure.     - Do not drink alcoholic beverages for 24 hours after your procedure.    WOUND CARE   *FOR RADIAL (WRIST) ACCESS*  ·      No lifting anything with affected arm, no bending or flexing affected wrist for 24 hours - for example, treat your wrist as if it is sprained.        ·      No lifting greater that 5 pounds with your affected arm for 5 days.  ·      Do not engage in vigorous activities (tennis, golf, bowling, weights) for at least 5 days after the procedure.  ·      Do not submerge the wrist in water for 7 days after the procedure.  ·      You should expect mild tingling in your hand and tenderness at the puncture site for up to 3 days.    - The transparent dressing should be removed from the site 24 hours after the procedure.  Wash the site gently with soap and  water. Rinse well and pat dry. Keep the area clean and dry. You may apply a Band-Aid to the site. Avoid lotions, ointments, or powders until fully healed.     - You may shower the day after your procedure.      - It is normal to notice a small bruise around the puncture site and/or a small grape sized or smaller lump. Any large bruising or large lump warrants a call to the office.     - If bleeding should occur apply pressure to the affected area for 10 minutes.  If the bleeding stops notify your physician.  If there is a large amount of bleeding or spurting of blood CALL 911 immediately.  DO NOT drive yourself to the hospital.    - You may experience some tenderness, bruising or minimal inflammation.  If you have any concerns, you may contact the Cath Lab or if any of these symptoms become excessive, contact your cardiologist or go to the emergency room.     OTHER INSTRUCTIONS  - You may take acetaminophen (Tylenol) as directed for discomfort.  If pain is not relieved with acetaminophen (Tylenol), contact your doctor.    - If you notice or experience any of the following, you should notify your doctor or seek medical attention  Chest pain or discomfort  Change in mental status or weakness in extremities.  Dizziness, light headedness, or feeling faint.  Change in the site where the procedure was performed, such as bleeding or an increased area of bruising or swelling.  Tingling, numbness, pain, or coolness in the leg/arm beyond the site where the procedure was performed.  Signs of infection (i.e. shaking chills, temperature > 100 degrees Fahrenheit, warmth, redness) in the leg/arm area where the procedure was performed.  Changes in urination   Bloody or black stools  Vomiting blood  Severe nose bleeds  Any excessive bleeding    - Please follow up with Dr. Andersen in 2 weeks or sooner if needed, [phone].

## 2024-07-26 NOTE — Clinical Note
Angioplasty of the circumflex lesion. Inflation 1: Pressure = 8 carlos; Duration = 8 sec. Inflation 2: Pressure = 6 carlos; Duration = 5 sec. LPLV

## 2024-07-26 NOTE — POST-PROCEDURE NOTE
Physician Transition of Care Summary  Invasive Cardiovascular Lab    Procedure Date: 7/26/2024  Attending:    * Etienne Paul - Primary  Complications:  No in-lab complications observed.     Cardiac Cath Post Procedure Notes:  Post Procedure Diagnosis: Status post successful PCI of L PLV with 1                            drug-eluting stent through right radial approach.  Blood Loss:               Estimated blood loss during the procedure was 5 mls.  Specimens Removed:        Number of specimen(s) removed: none.        Recommendations:  Maximize medical therapy.  Agressive risk factor modification efforts.  Follow-up with cardiology clinic.  Anti-platelet therapy with Aspirin and Ticagrelor 90mgs BID.  Lipid lowering agent or Statin therapy.  Consider referral to cardiac rehabilitation.  Medical management of coronary artery disease.     ____________________________________________________________________________________  CONCLUSIONS:   1. Angiographically normal left main            Luminal irregularities throughout LAD            Luminal irregularities throughout large dominant left circumflex with focal 90% stenosis in proximal L PLV            Small nondominant RCA with luminal irregularities            Left dominant coronary artery system            LVEDP 14 mmHg without significant gradient across aortic valve.     ICD 10 Codes:    Please refer to the full report that is in the system.    Electronically signed by: Etienne Paul MD PhD, 7/26/2024 12:20 PM

## 2024-07-30 ENCOUNTER — TELEPHONE (OUTPATIENT)
Dept: CARDIOLOGY | Facility: CLINIC | Age: 44
End: 2024-07-30
Payer: COMMERCIAL

## 2024-07-30 NOTE — TELEPHONE ENCOUNTER
Klayan is a patient of Dr. Andersen's referred to Dr. Paul for a C. He is S/P PCI on Fri, 07.26.2024. He is currently on DAPT and was prescribed Brilinta. On Sunday he noticed a rash on both of his feet but more on the left. He is not sure if this could be some sort of allergic reaction? It doesn't go past the ankles. Is not having any other symptoms and feels perfectly fine otherwise. No SOB or CP. Please advise.

## 2024-07-30 NOTE — TELEPHONE ENCOUNTER
Called pt to discuss. Pt had cath with PCI on Fri. 7/26/24. Pt states rash to both feet started on Sunday. No hives, not raised. Flat rash that does not go above ankles. Rash has not changed since Sunday. Pt states sometimes it feels itchy but he has not scratched.    Only new medication is Brilinta 90mg but pt is not sure if it could be something with the anesthesia given during procedure.     Please advise. Thanks.

## 2024-07-31 LAB
ATRIAL RATE: 68 BPM
ATRIAL RATE: 77 BPM
P AXIS: 28 DEGREES
P AXIS: 52 DEGREES
P OFFSET: 213 MS
P OFFSET: 215 MS
P ONSET: 158 MS
P ONSET: 159 MS
PR INTERVAL: 120 MS
PR INTERVAL: 124 MS
Q ONSET: 219 MS
Q ONSET: 220 MS
QRS COUNT: 11 BEATS
QRS COUNT: 13 BEATS
QRS DURATION: 92 MS
QRS DURATION: 98 MS
QT INTERVAL: 380 MS
QT INTERVAL: 382 MS
QTC CALCULATION(BAZETT): 406 MS
QTC CALCULATION(BAZETT): 430 MS
QTC FREDERICIA: 398 MS
QTC FREDERICIA: 412 MS
R AXIS: 46 DEGREES
R AXIS: 47 DEGREES
T AXIS: 12 DEGREES
T AXIS: 8 DEGREES
T OFFSET: 410 MS
T OFFSET: 410 MS
VENTRICULAR RATE: 68 BPM
VENTRICULAR RATE: 77 BPM

## 2024-08-12 PROBLEM — I20.89 ANGINA OF EFFORT (CMS-HCC): Status: ACTIVE | Noted: 2024-07-08

## 2024-08-12 PROBLEM — R79.89 LOW VITAMIN D LEVEL: Status: ACTIVE | Noted: 2024-07-08

## 2024-08-13 ENCOUNTER — APPOINTMENT (OUTPATIENT)
Dept: CARDIOLOGY | Facility: CLINIC | Age: 44
End: 2024-08-13
Payer: COMMERCIAL

## 2024-08-13 VITALS
OXYGEN SATURATION: 98 % | WEIGHT: 204 LBS | BODY MASS INDEX: 27.63 KG/M2 | HEIGHT: 72 IN | DIASTOLIC BLOOD PRESSURE: 60 MMHG | HEART RATE: 58 BPM | SYSTOLIC BLOOD PRESSURE: 110 MMHG

## 2024-08-13 DIAGNOSIS — Z95.820 S/P PERCUTANEOUS TRANSLUMINAL ANGIOPLASTY (PTA) WITH STENT PLACEMENT: ICD-10-CM

## 2024-08-13 DIAGNOSIS — I20.89 ANGINA OF EFFORT (CMS-HCC): Primary | ICD-10-CM

## 2024-08-13 DIAGNOSIS — I25.10 CORONARY ARTERY DISEASE INVOLVING NATIVE CORONARY ARTERY OF NATIVE HEART WITHOUT ANGINA PECTORIS: Chronic | ICD-10-CM

## 2024-08-13 PROCEDURE — 99214 OFFICE O/P EST MOD 30 MIN: CPT | Performed by: PHYSICIAN ASSISTANT

## 2024-08-13 PROCEDURE — 4004F PT TOBACCO SCREEN RCVD TLK: CPT | Performed by: PHYSICIAN ASSISTANT

## 2024-08-13 PROCEDURE — 3074F SYST BP LT 130 MM HG: CPT | Performed by: PHYSICIAN ASSISTANT

## 2024-08-13 PROCEDURE — 3078F DIAST BP <80 MM HG: CPT | Performed by: PHYSICIAN ASSISTANT

## 2024-08-13 PROCEDURE — 3008F BODY MASS INDEX DOCD: CPT | Performed by: PHYSICIAN ASSISTANT

## 2024-08-13 NOTE — PROGRESS NOTES
Chief Complaint:   New Patient Visit, CAD, s/p PCI     History Of Present Illness:    Kalyan Pollard is a 44 y.o. male presenting s/p LHC and PCI/OSMEL x 1 to [facility] for treatment of 90% proximal lesion.  Patient has noted increased orthostatic symptoms in the setting of marginal/low BP.  Patient denies chest pain, chest pressure, palpitations, dyspnea on exertion, shortness of breath at rest, diaphoresis, nausea/vomiting, back pain, headache, syncope or presyncopal episodes, active bleeding signs or symptoms, excessive weight gain, muscle or joint pain, claudication.       Last Recorded Vitals:  Vitals:    08/13/24 0805   BP: 110/60   BP Location: Left arm   Patient Position: Sitting   BP Cuff Size: Adult   Pulse: 58   SpO2: 98%   Weight: 92.5 kg (204 lb)   Height: 1.829 m (6')       Past Medical History:  He has a past medical history of CAD (coronary artery disease) (01/17/2024), Essential hypertension (03/24/2021), and Hyperlipidemia (11/06/2023).    Past Surgical History:  He has a past surgical history that includes Cardiac catheterization (N/A, 7/26/2024).      Social History:  He reports that he has been smoking cigarettes. He has a 10 pack-year smoking history. He has never used smokeless tobacco. He reports current alcohol use of about 12.0 standard drinks of alcohol per week. He reports that he does not use drugs.    Family History:  No family history on file.     Allergies:  Atorvastatin and Lisinopril    Outpatient Medications:  Current Outpatient Medications   Medication Instructions    amLODIPine (NORVASC) 5 mg, oral, Daily    aspirin 81 mg, oral, Daily    ergocalciferol (VITAMIN D-2) 50,000 Units, oral, Once Weekly    esomeprazole (NEXIUM) 20 mg, oral, As needed, Do not open capsule.    hydroCHLOROthiazide (MICROZIDE) 12.5 mg    losartan (Cozaar) 100 mg tablet 1 tabs, ORAL, DAILY, 90 tabs, 30, Date: 9/11/23 7:48:00 AM EDT, [id number], TAKE ONE TABLET BY MOUTH EVERY DAY, 182.88,  "05/13/2022 10:06:00 EDT, Height/Length Dosing, cm, 86.18, 05/13/2022 10:06:00 EDT, Weight Dosing, kg    metoprolol succinate XL (TOPROL-XL) 25 mg, oral, Daily    rosuvastatin (CRESTOR) 40 mg, oral, Daily    ticagrelor (BRILINTA) 90 mg, oral, 2 times daily       Physical Exam:  Constitutional: awake and alert, oriented ×3, no apparent distress  Skin: warm, dry, good turgor no obvious lesions  Eyes: pupils equal, round, reactive to light, conjunctiva pink and noninjected, no discharge  HENT: normocephalic and atraumatic, mucous membranes moist, trachea midline with no masses/goiter  Cardiovascular: S1/S2 regular, no murmur no rubs/gallops, no carotid bruits, no JVD  Pulmonary: symmetrical chest expansion, lungs are clear to auscultation bilaterally, no wheezes/rales/rhonchi, normal effort  Abdomen: nontender, nondistended, active bowel sounds, no ascites  Extremities: no cyanosis, clubbing, no LE edema no lesions; palpable pedal pulses  Neurologic: cranial nerves II - XII grossly intact, stable gait, no tremor       Last Labs:  CBC -  Lab Results   Component Value Date    WBC 10.0 07/22/2024    HGB 14.9 07/22/2024    HCT 42.5 07/22/2024    MCV 99 07/22/2024     07/22/2024       CMP -  Lab Results   Component Value Date    CALCIUM 9.3 07/22/2024    PROT 7.3 11/06/2023    ALBUMIN 4.5 11/06/2023    AST 17 11/06/2023    ALT 27 11/06/2023    ALKPHOS 57 11/06/2023    BILITOT 0.5 11/06/2023       LIPID PANEL -   No results found for: \"CHOL\", \"TRIG\", \"HDL\", \"CHHDL\", \"LDLF\", \"VLDL\", \"NHDL\"    RENAL FUNCTION PANEL -   Lab Results   Component Value Date    GLUCOSE 98 07/22/2024     07/22/2024    K 3.9 07/22/2024     07/22/2024    CO2 25 07/22/2024    ANIONGAP 14 07/22/2024    BUN 14 07/22/2024    CREATININE 0.93 07/22/2024    CALCIUM 9.3 07/22/2024    ALBUMIN 4.5 11/06/2023        No results found for: \"BNP\", \"HGBA1C\"    Last Cardiology Tests:  ECG:  Electrocardiogram 12 Lead 07/26/2024      Echo:  No results " "found for this or any previous visit from the past 1095 days.      Ejection Fractions:  No results found for: \"EF\"    Cath:  Cardiac Catheterization Procedure 07/26/2024  Angiographically normal left main     Luminal irregularities throughout LAD     Luminal irregularities throughout large dominant left circumflex with focal 90% stenosis in proximal L PLV     Small nondominant RCA with luminal irregularities     Left dominant coronary artery system     LVEDP 14 mmHg without significant gradient across aortic valve.        Coronary Interventions:  Angiography reveals a 90% stenosis of the proximal circumflex and left posterolateral branch coronary arteries. Pre-intervention JORDY flow was 3. Percutaneous coronary intervention was performed within the proximal circumflex and left posterolateral branch. The stenosis was successfully reduced from 90% to 0%. Post-intervention JORDY flow was 3. We proceeded with PCI of the L PLV.  Patient is already on aspirin and received 180 mg of Brilinta in the Cath Lab.     Heparin was given in the Cath Lab.  Intracoronary boluses of nitroglycerin was given in the Cath Lab.  A 6 [place] EBU 3.5 guide catheter was used to engage the left coronary artery.  Left circumflex into PLV was wired using a BMW wire and predilated the lesion with 2.5 x 8 mm NC balloon. Subsequently I delivered and deployed a 2.75 x 15 mm [place] drug-eluting stent and postdilated that using a 3.0 NC balloon and several sequential inflations.     Excellent angiographic result with JORDY-3 flow and 0% residual stenosis were achieved.     A TR band was used to obtain hemostasis in the right wrist.     Patient tolerated procedure well and transferred to recovery area for further care monitoring.       Stress Test:  Nuclear Stress Test       Cardiac Imaging:  CT cardiac scoring wo IV contrast 06/29/2023      Assessment/Plan   Problem List Items Addressed This Visit             ICD-10-CM       Cardiac and Vasculature    CAD " (coronary artery disease) (Chronic) I25.10    S/P percutaneous transluminal angioplasty (PTA) with stent placement Z95.820    Angina of effort (CMS-HCC) - Primary I20.89       -S/P PCI/OSMEL x 1 to pPLVB, luminal irregularities throughout remaining coronary system    -DAPT for 1 year    -GDMT as prescribed    -F/U Dr. Andersen in 3-4 months    HECTOR Royal[facility]

## 2024-08-19 ENCOUNTER — TELEPHONE (OUTPATIENT)
Dept: CARDIOLOGY | Facility: CLINIC | Age: 44
End: 2024-08-19
Payer: COMMERCIAL

## 2024-08-19 NOTE — TELEPHONE ENCOUNTER
Patient called and reporting BP's now 104/61, 106/60. At last visit you had discontinued his amlodipine. He does get some dizziness when he goes from sitting awhile to standing and then it goes away.  He is still on metoprolol 25 every day, losartan 100 and hydrochlorothiazide 12.5 daily. Would you like any further med adjustments?

## 2024-08-20 NOTE — TELEPHONE ENCOUNTER
Called and talked to patient. He will decrease his losartan to 50mg daily and keep an eye on his BP. I sent a new script to his pharmacy.

## 2024-10-21 DIAGNOSIS — I10 ESSENTIAL HYPERTENSION: Primary | Chronic | ICD-10-CM

## 2024-10-21 RX ORDER — METOPROLOL SUCCINATE 25 MG/1
25 TABLET, EXTENDED RELEASE ORAL DAILY
Qty: 90 TABLET | Refills: 3 | Status: SHIPPED | OUTPATIENT
Start: 2024-10-21

## 2024-10-21 RX ORDER — HYDROCHLOROTHIAZIDE 12.5 MG/1
12.5 TABLET ORAL DAILY
Qty: 90 TABLET | Refills: 3 | Status: SHIPPED | OUTPATIENT
Start: 2024-10-21

## 2024-10-21 NOTE — TELEPHONE ENCOUNTER
Pt requested refill for hydrochlorothiazide 12.5mg and Metoprolol Succinate 25mg to [facility].

## 2024-11-14 PROBLEM — I20.89 ANGINA OF EFFORT (CMS-HCC): Status: RESOLVED | Noted: 2024-07-08 | Resolved: 2024-11-14

## 2024-11-14 PROBLEM — Z95.820 S/P PERCUTANEOUS TRANSLUMINAL ANGIOPLASTY (PTA) WITH STENT PLACEMENT: Status: RESOLVED | Noted: 2024-07-26 | Resolved: 2024-11-14

## 2024-11-15 ENCOUNTER — OFFICE VISIT (OUTPATIENT)
Dept: CARDIOLOGY | Facility: CLINIC | Age: 44
End: 2024-11-15
Payer: COMMERCIAL

## 2024-11-15 VITALS
WEIGHT: 218 LBS | HEART RATE: 84 BPM | SYSTOLIC BLOOD PRESSURE: 136 MMHG | DIASTOLIC BLOOD PRESSURE: 82 MMHG | OXYGEN SATURATION: 98 % | BODY MASS INDEX: 29.57 KG/M2

## 2024-11-15 DIAGNOSIS — R07.89 OTHER CHEST PAIN: ICD-10-CM

## 2024-11-15 DIAGNOSIS — I10 ESSENTIAL HYPERTENSION: Chronic | ICD-10-CM

## 2024-11-15 DIAGNOSIS — Z95.820 S/P PERCUTANEOUS TRANSLUMINAL ANGIOPLASTY (PTA) WITH STENT PLACEMENT: ICD-10-CM

## 2024-11-15 DIAGNOSIS — I25.10 CORONARY ARTERY DISEASE INVOLVING NATIVE CORONARY ARTERY OF NATIVE HEART WITHOUT ANGINA PECTORIS: Primary | Chronic | ICD-10-CM

## 2024-11-15 DIAGNOSIS — E78.2 MIXED HYPERLIPIDEMIA: Chronic | ICD-10-CM

## 2024-11-15 DIAGNOSIS — R06.09 DYSPNEA ON EXERTION: ICD-10-CM

## 2024-11-15 PROCEDURE — 4004F PT TOBACCO SCREEN RCVD TLK: CPT | Performed by: INTERNAL MEDICINE

## 2024-11-15 PROCEDURE — 3079F DIAST BP 80-89 MM HG: CPT | Performed by: INTERNAL MEDICINE

## 2024-11-15 PROCEDURE — 99214 OFFICE O/P EST MOD 30 MIN: CPT | Performed by: INTERNAL MEDICINE

## 2024-11-15 PROCEDURE — 3075F SYST BP GE 130 - 139MM HG: CPT | Performed by: INTERNAL MEDICINE

## 2024-11-15 RX ORDER — METOPROLOL SUCCINATE 25 MG/1
25 TABLET, EXTENDED RELEASE ORAL DAILY
Qty: 90 TABLET | Refills: 3 | Status: SHIPPED | OUTPATIENT
Start: 2024-11-15

## 2024-11-15 NOTE — PROGRESS NOTES
Referred by No ref. provider found    HPI     I am seeing Kalyan in follow-up.  When I seen him in July it was sent as an urgent add-on because he was having increasing shortness of breath atypical chest pain and palpitations.  He had an elevated calcium score.  I sent him for cardiac catheterization.  He is found to have a left-sided posterolateral branch with a 90% stenosis.  Successfully stented.  Since then although symptoms resolved shortness of breath improved substantially.  Still smoking several cigarettes a day.  Unfortunately weight is gone up 18 pounds.    Past Medical History:  Problem List Items Addressed This Visit    None     Past Medical History:   Diagnosis Date    CAD (coronary artery disease) 01/17/2024 6/29/2023 = 336 (LM 70, , LCx 13, RCA 9)    Essential hypertension 03/24/2021    Hyperlipidemia 11/06/2023        Past Surgical History:  He has a past surgical history that includes Cardiac catheterization (N/A, 7/26/2024).      Social History:  He reports that he has been smoking cigarettes. He has a 10 pack-year smoking history. He has never used smokeless tobacco. He reports current alcohol use of about 12.0 standard drinks of alcohol per week. He reports that he does not use drugs.    Family History:  No family history on file.     Allergies:  Atorvastatin and Lisinopril    Outpatient Medications:  Current Outpatient Medications   Medication Instructions    aspirin 81 mg, oral, Daily    ergocalciferol (VITAMIN D-2) 50,000 Units, oral, Once Weekly    esomeprazole (NEXIUM) 20 mg, oral, As needed, Do not open capsule.    hydroCHLOROthiazide (MICROZIDE) 12.5 mg, oral, Daily    losartan (COZAAR) 50 mg, oral, Daily    metoprolol succinate XL (TOPROL-XL) 25 mg, oral, Daily    rosuvastatin (CRESTOR) 40 mg, oral, Daily    ticagrelor (BRILINTA) 90 mg, oral, 2 times daily     Last Recorded Vitals:  There were no vitals filed for this visit.    Physical Exam  Patient is alert and oriented  "x3.  HEENT is unremarkable mucous members are moist  Neck no JVP no bruits upstrokes are full no thyromegaly  Lungs are clear bilaterally.  No wheezing crackles or rales  Heart regular rhythm normal S1-S2 there is no S3 no murmurs are heard.  Abdomen is soft bs are positive nontender nondistended no organomegaly no pulsatile masses  Extremities have no edema.  Distal pulses present palpable.  Neuro is grossly nonfocal  Skin has no rashes     Last Labs:  CBC -  Lab Results   Component Value Date    WBC 10.0 07/22/2024    HGB 14.9 07/22/2024    HCT 42.5 07/22/2024    MCV 99 07/22/2024     07/22/2024     CMP -  Lab Results   Component Value Date    CALCIUM 9.3 07/22/2024    PROT 7.3 11/06/2023    ALBUMIN 4.5 11/06/2023    AST 17 11/06/2023    ALT 27 11/06/2023    ALKPHOS 57 11/06/2023    BILITOT 0.5 11/06/2023     LIPID PANEL -   No results found for: \"CHOL\", \"HDL\", \"CHHDL\", \"LDL\", \"VLDL\", \"TRIG\", \"NHDL\"    RENAL FUNCTION PANEL -   Lab Results   Component Value Date    K 3.9 07/22/2024     No results found for: \"BNP\", \"HGBA1C\"       Procedure    Cardiac cath 7/26/2024Luminal irregularities LAD circumflex, 90% left sided posterolateral vessel of the circumflex stenosis status post OSMEL small nondominant RCA    CT / SCORING [06/29/2023] = 336 (LM 70 â€“ mod/lrg plaque component,  â€“ plaque scattered throughout w/lrg component w/in prox to mid portion, LCx 13 â€“ small plaque distally, RCA 9 â€“ small plaque proximally) â€¦ involvement greatest within LAD      EX NST [03/29/2023]: 9 min 20 sec (10.60 METs) . . . Normal â€“ 54%     ECHO [03/29/2023]: LVSF normal, est EF 60-65%     Assessment/Plan   1. CAD. Calcium score 336 units the majority located in the LAD territory.  Stress test without ischemia.  Last evaluation patient having increasing shortness of breath along with atypical chest pain which she had chronically.  Cardiac catheterization 7/2024  found to have a 90% posterolateral vessel of the " circumflex status post OSMEL.  All symptoms resolved with this.  Continue DAPT until July 2025.    2. Hyperlipidemia.  He is now on 40 mg of rosuvastatin.  Followed by Dr. Limon.  Target LDL less than 70.    3. Hypertension well-controlled. Typically at home the SBP is130.  Better controlled now that he is on hydrochlorothiazide     4. Elevated heart rate.  Heart rate control is reasonable.  He is on metoprolol 25.    5.  Tobacco abuse.  He is still smoking 5 cigarettes a day.  Weight is gone up 18 pounds because he is trying to cut down.  He has Chantix at home he also has nicotine patches at home.  I am encouraging him to stop.     I personally reviewed his catheterization images.  He is doing well.  My plan will be to see him back in July 2025 at which point we will likely stop the Brilinta.    Leonides Andersen MD     Instructions and follow up

## 2024-11-15 NOTE — PATIENT INSTRUCTIONS
1. CAD. Calcium score 336 units the majority located in the LAD territory.  Stress test without ischemia.  Last evaluation patient having increasing shortness of breath along with atypical chest pain which she had chronically.  Cardiac catheterization 7/2024  found to have a 90% posterolateral vessel of the circumflex status post OSMEL.  All symptoms resolved with this.  Continue DAPT until July 2025.    2. Hyperlipidemia.  He is now on 40 mg of rosuvastatin.  Followed by Dr. Limon.  Target LDL less than 70.    3. Hypertension well-controlled. Typically at home the SBP is130.  Better controlled now that he is on hydrochlorothiazide     4. Elevated heart rate.  Heart rate control is reasonable.  He is on metoprolol 25.    5.  Tobacco abuse.  He is still smoking 5 cigarettes a day.  Weight is gone up 18 pounds because he is trying to cut down.  He has Chantix at home he also has nicotine patches at home.  I am encouraging him to stop.     I personally reviewed his catheterization images.  He is doing well.  My plan will be to see him back in July 2025 at which point we will likely stop the Brilinta.

## 2024-12-09 ENCOUNTER — APPOINTMENT (OUTPATIENT)
Dept: RADIOLOGY | Facility: HOSPITAL | Age: 44
End: 2024-12-09
Payer: COMMERCIAL

## 2024-12-09 ENCOUNTER — HOSPITAL ENCOUNTER (EMERGENCY)
Facility: HOSPITAL | Age: 44
Discharge: LEFT BEFORE DISCHARGE | End: 2024-12-09
Payer: COMMERCIAL

## 2024-12-09 ENCOUNTER — APPOINTMENT (OUTPATIENT)
Dept: CARDIOLOGY | Facility: HOSPITAL | Age: 44
End: 2024-12-09
Payer: COMMERCIAL

## 2024-12-09 VITALS
OXYGEN SATURATION: 97 % | BODY MASS INDEX: 29.53 KG/M2 | RESPIRATION RATE: 18 BRPM | WEIGHT: 218.03 LBS | TEMPERATURE: 98.4 F | DIASTOLIC BLOOD PRESSURE: 84 MMHG | HEIGHT: 72 IN | SYSTOLIC BLOOD PRESSURE: 185 MMHG | HEART RATE: 94 BPM

## 2024-12-09 LAB
ALBUMIN SERPL BCP-MCNC: 4.3 G/DL (ref 3.4–5)
ALP SERPL-CCNC: 61 U/L (ref 33–120)
ALT SERPL W P-5'-P-CCNC: 28 U/L (ref 10–52)
ANION GAP SERPL CALC-SCNC: 13 MMOL/L (ref 10–20)
AST SERPL W P-5'-P-CCNC: 18 U/L (ref 9–39)
BASOPHILS # BLD AUTO: 0.06 X10*3/UL (ref 0–0.1)
BASOPHILS NFR BLD AUTO: 0.6 %
BILIRUB SERPL-MCNC: 0.3 MG/DL (ref 0–1.2)
BNP SERPL-MCNC: 27 PG/ML (ref 0–99)
BUN SERPL-MCNC: 15 MG/DL (ref 6–23)
CALCIUM SERPL-MCNC: 9.5 MG/DL (ref 8.6–10.3)
CARDIAC TROPONIN I PNL SERPL HS: 11 NG/L (ref 0–20)
CHLORIDE SERPL-SCNC: 104 MMOL/L (ref 98–107)
CO2 SERPL-SCNC: 24 MMOL/L (ref 21–32)
CREAT SERPL-MCNC: 1.01 MG/DL (ref 0.5–1.3)
D DIMER PPP FEU-MCNC: 316 NG/ML FEU
EGFRCR SERPLBLD CKD-EPI 2021: >90 ML/MIN/1.73M*2
EOSINOPHIL # BLD AUTO: 0.29 X10*3/UL (ref 0–0.7)
EOSINOPHIL NFR BLD AUTO: 2.8 %
ERYTHROCYTE [DISTWIDTH] IN BLOOD BY AUTOMATED COUNT: 12.3 % (ref 11.5–14.5)
GLUCOSE SERPL-MCNC: 118 MG/DL (ref 74–99)
HCT VFR BLD AUTO: 46 % (ref 41–52)
HGB BLD-MCNC: 15.6 G/DL (ref 13.5–17.5)
IMM GRANULOCYTES # BLD AUTO: 0.03 X10*3/UL (ref 0–0.7)
IMM GRANULOCYTES NFR BLD AUTO: 0.3 % (ref 0–0.9)
LYMPHOCYTES # BLD AUTO: 2.54 X10*3/UL (ref 1.2–4.8)
LYMPHOCYTES NFR BLD AUTO: 24.4 %
MAGNESIUM SERPL-MCNC: 1.94 MG/DL (ref 1.6–2.4)
MCH RBC QN AUTO: 34.6 PG (ref 26–34)
MCHC RBC AUTO-ENTMCNC: 33.9 G/DL (ref 32–36)
MCV RBC AUTO: 102 FL (ref 80–100)
MONOCYTES # BLD AUTO: 0.77 X10*3/UL (ref 0.1–1)
MONOCYTES NFR BLD AUTO: 7.4 %
NEUTROPHILS # BLD AUTO: 6.7 X10*3/UL (ref 1.2–7.7)
NEUTROPHILS NFR BLD AUTO: 64.5 %
NRBC BLD-RTO: 0 /100 WBCS (ref 0–0)
PLATELET # BLD AUTO: 230 X10*3/UL (ref 150–450)
POTASSIUM SERPL-SCNC: 3.7 MMOL/L (ref 3.5–5.3)
PROT SERPL-MCNC: 7.3 G/DL (ref 6.4–8.2)
RBC # BLD AUTO: 4.51 X10*6/UL (ref 4.5–5.9)
SODIUM SERPL-SCNC: 137 MMOL/L (ref 136–145)
WBC # BLD AUTO: 10.4 X10*3/UL (ref 4.4–11.3)

## 2024-12-09 PROCEDURE — 93005 ELECTROCARDIOGRAM TRACING: CPT

## 2024-12-09 PROCEDURE — 80053 COMPREHEN METABOLIC PANEL: CPT | Performed by: NURSE PRACTITIONER

## 2024-12-09 PROCEDURE — 84484 ASSAY OF TROPONIN QUANT: CPT | Performed by: NURSE PRACTITIONER

## 2024-12-09 PROCEDURE — 36415 COLL VENOUS BLD VENIPUNCTURE: CPT | Performed by: NURSE PRACTITIONER

## 2024-12-09 PROCEDURE — 83735 ASSAY OF MAGNESIUM: CPT | Performed by: NURSE PRACTITIONER

## 2024-12-09 PROCEDURE — 71046 X-RAY EXAM CHEST 2 VIEWS: CPT | Mod: FOREIGN READ | Performed by: RADIOLOGY

## 2024-12-09 PROCEDURE — 83880 ASSAY OF NATRIURETIC PEPTIDE: CPT | Performed by: NURSE PRACTITIONER

## 2024-12-09 PROCEDURE — 99285 EMERGENCY DEPT VISIT HI MDM: CPT | Mod: 25

## 2024-12-09 PROCEDURE — 85379 FIBRIN DEGRADATION QUANT: CPT | Performed by: NURSE PRACTITIONER

## 2024-12-09 PROCEDURE — 71046 X-RAY EXAM CHEST 2 VIEWS: CPT

## 2024-12-09 PROCEDURE — 85025 COMPLETE CBC W/AUTO DIFF WBC: CPT | Performed by: NURSE PRACTITIONER

## 2024-12-09 ASSESSMENT — PAIN SCALES - GENERAL
PAINLEVEL_OUTOF10: 9
PAINLEVEL_OUTOF10: 9

## 2024-12-09 ASSESSMENT — COLUMBIA-SUICIDE SEVERITY RATING SCALE - C-SSRS
2. HAVE YOU ACTUALLY HAD ANY THOUGHTS OF KILLING YOURSELF?: NO
1. IN THE PAST MONTH, HAVE YOU WISHED YOU WERE DEAD OR WISHED YOU COULD GO TO SLEEP AND NOT WAKE UP?: NO
6. HAVE YOU EVER DONE ANYTHING, STARTED TO DO ANYTHING, OR PREPARED TO DO ANYTHING TO END YOUR LIFE?: NO

## 2024-12-09 ASSESSMENT — PAIN DESCRIPTION - LOCATION: LOCATION: CHEST

## 2024-12-09 ASSESSMENT — PAIN - FUNCTIONAL ASSESSMENT
PAIN_FUNCTIONAL_ASSESSMENT: 0-10
PAIN_FUNCTIONAL_ASSESSMENT: 0-10

## 2024-12-09 NOTE — ED TRIAGE NOTES
Pt states that he has been experiencing right sided chest pain that radiates to his back since yesterday. He has been experiencing shortness of breath, but states that this is not new for him.

## 2024-12-09 NOTE — ED TRIAGE NOTES
Secondary to patient volumes and overcrowding, I performed a brief medical screening exam of the patient in triage, as the patient awaits space in the main ED.    History of Present Illness:  Kalyan Pollard presents with   Chief Complaint   Patient presents with    Chest Pain   Radiates through to his back, stabbing in nature, no focal neurodeficits.  Patient is a smoker    Physical Exam:  General - In no acute distress  Respiratory - Breathing comfortably  Cardiac - Normal S1, S2, no m/g/r, bilateral radial pulses 2+, bilateral femoral pulses 2+.  Neurologic - Moving all extremities      Medical Decision Making:  Patient will require further evaluation in the main ED.    Initial diagnostic tests were ordered from triage.      The patient demonstrates understanding that this initial evaluation is a brief medical screening exam and the expectation is that they await for space in the main ED to be further evaluated.  The patient understands that, if they leave prior to further evaluation in the main ED after this initial evaluation in triage, they are doing so under their own accord knowing that their evaluation/work-up is not yet complete. The patient also understands that any preliminary diagnostic results, including abnormalities, may not be shared with them, if they choose to leave prior to further evaluation in the main ED.

## 2024-12-15 LAB
ATRIAL RATE: 101 BPM
P AXIS: 54 DEGREES
P OFFSET: 212 MS
P ONSET: 161 MS
PR INTERVAL: 118 MS
Q ONSET: 220 MS
QRS COUNT: 16 BEATS
QRS DURATION: 86 MS
QT INTERVAL: 330 MS
QTC CALCULATION(BAZETT): 427 MS
QTC FREDERICIA: 392 MS
R AXIS: 39 DEGREES
T AXIS: 10 DEGREES
T OFFSET: 385 MS
VENTRICULAR RATE: 101 BPM

## 2025-07-08 ENCOUNTER — APPOINTMENT (OUTPATIENT)
Dept: CARDIOLOGY | Facility: CLINIC | Age: 45
End: 2025-07-08
Payer: COMMERCIAL

## 2025-07-08 VITALS
WEIGHT: 210 LBS | HEART RATE: 86 BPM | OXYGEN SATURATION: 96 % | BODY MASS INDEX: 28.48 KG/M2 | DIASTOLIC BLOOD PRESSURE: 78 MMHG | SYSTOLIC BLOOD PRESSURE: 124 MMHG

## 2025-07-08 DIAGNOSIS — F17.200 TOBACCO USE DISORDER: ICD-10-CM

## 2025-07-08 DIAGNOSIS — Z09 HOSPITAL DISCHARGE FOLLOW-UP: ICD-10-CM

## 2025-07-08 DIAGNOSIS — I10 ESSENTIAL HYPERTENSION: Chronic | ICD-10-CM

## 2025-07-08 DIAGNOSIS — R07.89 OTHER CHEST PAIN: ICD-10-CM

## 2025-07-08 DIAGNOSIS — I25.10 CORONARY ARTERY DISEASE INVOLVING NATIVE CORONARY ARTERY OF NATIVE HEART WITHOUT ANGINA PECTORIS: Primary | Chronic | ICD-10-CM

## 2025-07-08 DIAGNOSIS — E78.2 MIXED HYPERLIPIDEMIA: Chronic | ICD-10-CM

## 2025-07-08 PROCEDURE — 99406 BEHAV CHNG SMOKING 3-10 MIN: CPT | Performed by: INTERNAL MEDICINE

## 2025-07-08 PROCEDURE — 99214 OFFICE O/P EST MOD 30 MIN: CPT | Performed by: INTERNAL MEDICINE

## 2025-07-08 PROCEDURE — 3074F SYST BP LT 130 MM HG: CPT | Performed by: INTERNAL MEDICINE

## 2025-07-08 PROCEDURE — 99212 OFFICE O/P EST SF 10 MIN: CPT | Mod: 25 | Performed by: INTERNAL MEDICINE

## 2025-07-08 PROCEDURE — 3078F DIAST BP <80 MM HG: CPT | Performed by: INTERNAL MEDICINE

## 2025-07-08 RX ORDER — EZETIMIBE 10 MG/1
10 TABLET ORAL DAILY
COMMUNITY

## 2025-07-08 RX ORDER — LOSARTAN POTASSIUM 100 MG/1
100 TABLET ORAL DAILY
COMMUNITY

## 2025-07-08 NOTE — PROGRESS NOTES
Referred by Ashly MATHIS     Seen in ER 12/9/24 for CP. Signed out AMA.  The pain that he had was there almost the whole day before he went into the ER.  His EKG did not reveal anything significant.  It lasted few hours after last and has not recurred.  Physically he is active.  Notices fatigue.  He did give up smoking for period time now is down to 2 cigarettes a day.    Past Medical History:  Problem List Items Addressed This Visit    None     Past Medical History:   Diagnosis Date    CAD (coronary artery disease) 01/17/2024 6/29/2023 = 336 (LM 70, , LCx 13, RCA 9)    Essential hypertension 03/24/2021    Hyperlipidemia 11/06/2023     Past Surgical History:  He has a past surgical history that includes Cardiac catheterization (N/A, 7/26/2024).      Social History:  He reports that he has been smoking cigarettes. He has a 10 pack-year smoking history. He has never used smokeless tobacco. He reports current alcohol use of about 12.0 standard drinks of alcohol per week. He reports that he does not use drugs.    Family History:  No family history on file.     Allergies:  Atorvastatin and Lisinopril    Outpatient Medications:  Current Outpatient Medications   Medication Instructions    aspirin 81 mg, oral, Daily    ergocalciferol (VITAMIN D-2) 50,000 Units, Once Weekly    esomeprazole (NEXIUM) 20 mg, As needed    hydroCHLOROthiazide (MICROZIDE) 12.5 mg, oral, Daily    losartan (COZAAR) 50 mg, oral, Daily    metoprolol succinate XL (TOPROL-XL) 25 mg, oral, Daily    rosuvastatin (CRESTOR) 40 mg, oral, Daily    ticagrelor (BRILINTA) 90 mg, oral, 2 times daily     Last Recorded Vitals:  There were no vitals filed for this visit.    Physical Exam  Patient is alert and oriented x3.  HEENT is unremarkable mucous members are moist  Neck no JVP no bruits upstrokes are full no thyromegaly  Lungs are clear bilaterally.  No wheezing crackles or rales  Heart regular rhythm normal S1-S2 there is no S3 no murmurs are  "heard.  Abdomen is soft bs are positive nontender nondistended no organomegaly no pulsatile masses  Extremities have no edema.  Distal pulses present palpable.  Neuro is grossly nonfocal  Skin has no rashes     Last Labs:  CBC -  Lab Results   Component Value Date    WBC 10.4 12/09/2024    HGB 15.6 12/09/2024    HCT 46.0 12/09/2024     (H) 12/09/2024     12/09/2024     CMP -  Lab Results   Component Value Date    CALCIUM 9.5 12/09/2024    PROT 7.3 12/09/2024    ALBUMIN 4.3 12/09/2024    AST 18 12/09/2024    ALT 28 12/09/2024    ALKPHOS 61 12/09/2024    BILITOT 0.3 12/09/2024     LIPID PANEL -   No results found for: \"CHOL\", \"HDL\", \"CHHDL\", \"LDL\", \"VLDL\", \"TRIG\", \"NHDL\"    RENAL FUNCTION PANEL -   Lab Results   Component Value Date    K 3.7 12/09/2024     Lab Results   Component Value Date    BNP 27 12/09/2024          Procedure    Cardiac cath 7/26/2024Luminal irregularities LAD circumflex, 90% left sided posterolateral vessel of the circumflex stenosis status post OSMEL small nondominant RCA    CT / SCORING [06/29/2023] = 336 (LM 70 â€“ mod/lrg plaque component,  â€“ plaque scattered throughout w/lrg component w/in prox to mid portion, LCx 13 â€“ small plaque distally, RCA 9 â€“ small plaque proximally) â€¦ involvement greatest within LAD      EX NST [03/29/2023]: 9 min 20 sec (10.60 METs) . . . Normal â€“ 54%     ECHO [03/29/2023]: LVSF normal, est EF 60-65%     Assessment/Plan   1. CAD. Calcium score 336 units the majority located in the LAD territory.  July 2024 for symptoms of chest pain.  Catheterization revealed a 90% posterolateral vessel of the circumflex with requiring drug-eluting stenting.  Improvement in shortness of breath at that time.  Today, I will stop his Brilinta.  Vague episode of chest pain in December.  It lasted the whole day.  Despite that no diagnostic EKG changes.  He did sign out AMA from the emergency room.  He followed up with Dr. Limon and everything was fine.  No " recurrence.    2. Hyperlipidemia.  He is now on 40 mg of rosuvastatin.  Followed by Dr. Limon.  Target LDL less than 70.    3. Hypertension well-controlled. Typically at home the SBP is130.  Better controlled now that he is on hydrochlorothiazide     4. Elevated heart rate.  Heart rate control is reasonable.  He is on metoprolol 25.    5.  Tobacco abuse.  He quit for a while.  He is now down to 2 cigarettes.  I am hoping he can fully stop. >5 min    Stop Brilinta today. RTC 1 year.     Leonides Andersen MD     Instructions and follow up

## 2025-07-08 NOTE — PATIENT INSTRUCTIONS
1. CAD. Calcium score 336 units the majority located in the LAD territory.  July 2024 for symptoms of chest pain.  Catheterization revealed a 90% posterolateral vessel of the circumflex with requiring drug-eluting stenting.  Improvement in shortness of breath at that time.  Today, I will stop his Brilinta.  Vague episode of chest pain in December.  It lasted the whole day.  Despite that no diagnostic EKG changes.  He did sign out AMA from the emergency room.  He followed up with Dr. Limon and everything was fine.  No recurrence.    2. Hyperlipidemia.  He is now on 40 mg of rosuvastatin.  Followed by Dr. Limon.  Target LDL less than 70.    3. Hypertension well-controlled. Typically at home the SBP is130.  Better controlled now that he is on hydrochlorothiazide     4. Elevated heart rate.  Heart rate control is reasonable.  He is on metoprolol 25.    5.  Tobacco abuse.  He quit for a while.  He is now down to 2 cigarettes.  I am hoping he can fully stop. >5 min    Stop Brilinta today. RTC 1 year.

## (undated) DEVICE — CATHETER, BALLOON, NC EUPHORA NONCOMPLIANT 3.0 X 6 X 142CM

## (undated) DEVICE — ELECTRODE, MULTIFUNCTION, QUICK-COMBO, EDGE SYSTEM, 2 FT

## (undated) DEVICE — GUIDEWIRE, UNIVERSAL BALANCE MID WEIGHT

## (undated) DEVICE — Device

## (undated) DEVICE — TR BAND, RADIAL COMPRESSION, STANDARD, 24CM

## (undated) DEVICE — GUIDEWIRE, INQWIRE, 3MM J, .035 X 210CM, FIXED

## (undated) DEVICE — CATHETER, GUIDING, LAUNCHER, 6FR EBU 3.5

## (undated) DEVICE — SHEATH, GLIDESHEATH, SLENDER, 6FR 10CM

## (undated) DEVICE — DEVICE KIT, INFLATION, CUSTOM, PARMA

## (undated) DEVICE — CATHETER, BALLOON, MONORAIL, NC EMERGE, PTCA, 8MM X 2.50MM

## (undated) DEVICE — CATHETER, OPTITORQUE, 6FR 110CM, TIGER RADIAL 4.0

## (undated) DEVICE — VALVE, CONTROL BLEEDBACK

## (undated) DEVICE — CATHETER, BALLOON, NC EUPHORA NONCOMPLIANT 3.0 X 8 X 142CM